# Patient Record
Sex: FEMALE | Race: WHITE | NOT HISPANIC OR LATINO | Employment: FULL TIME | URBAN - METROPOLITAN AREA
[De-identification: names, ages, dates, MRNs, and addresses within clinical notes are randomized per-mention and may not be internally consistent; named-entity substitution may affect disease eponyms.]

---

## 2018-04-26 ENCOUNTER — OFFICE VISIT (OUTPATIENT)
Dept: GASTROENTEROLOGY | Facility: CLINIC | Age: 43
End: 2018-04-26

## 2018-04-26 VITALS
BODY MASS INDEX: 48.32 KG/M2 | WEIGHT: 283 LBS | HEIGHT: 64 IN | SYSTOLIC BLOOD PRESSURE: 136 MMHG | TEMPERATURE: 98.2 F | DIASTOLIC BLOOD PRESSURE: 96 MMHG

## 2018-04-26 DIAGNOSIS — K21.00 GASTROESOPHAGEAL REFLUX DISEASE WITH ESOPHAGITIS: Primary | ICD-10-CM

## 2018-04-26 PROCEDURE — 99213 OFFICE O/P EST LOW 20 MIN: CPT | Performed by: INTERNAL MEDICINE

## 2018-04-26 RX ORDER — LEVOTHYROXINE SODIUM 0.15 MG/1
125 TABLET ORAL DAILY
COMMUNITY

## 2018-04-26 RX ORDER — PANTOPRAZOLE SODIUM 40 MG/1
40 TABLET, DELAYED RELEASE ORAL DAILY
Qty: 30 TABLET | Refills: 11 | Status: SHIPPED | OUTPATIENT
Start: 2018-04-26 | End: 2019-05-28 | Stop reason: SDUPTHER

## 2018-04-26 RX ORDER — CALCITRIOL 0.5 UG/1
0.25 CAPSULE, LIQUID FILLED ORAL DAILY
COMMUNITY

## 2018-04-26 RX ORDER — DICYCLOMINE HYDROCHLORIDE 10 MG/1
10 CAPSULE ORAL
COMMUNITY
End: 2018-04-26

## 2018-04-26 NOTE — PROGRESS NOTES
Chief Complaint   Patient presents with   • Abdominal Pain     Dyspepsia       Lucila Amaya is a  43 y.o. female here for a follow up visit for GERD.    HPI    Patient 43-year-old female with history of GERD and esophageal stricture in the remote past presenting with recurrent symptoms.  Patient had been doing very well on pantoprazole but according to patient was stopped because of insurance coverage.  Insurance reported to the patient that they would cover ranitidine and changed her prescription to that.  Unfortunately patient had rather rapid return of her symptoms and now having episodes of dysphagia related to poor food passage.  Patient daily feels like the food is moving down to her stomach been moving back into her esophagus.  Patient reports no fever or chills but positive for weight loss.  Patient now for further recommendations.    Past Medical History:   Diagnosis Date   • Diverticulitis    • GERD (gastroesophageal reflux disease)    • Peptic ulcer disease    • Thyroid cancer    • Thyroid disorder    • TIA (transient ischemic attack)          Current Outpatient Prescriptions:   •  calcitriol (ROCALTROL) 0.5 MCG capsule, Take 2 mcg by mouth Daily., Disp: , Rfl:   •  levothyroxine (SYNTHROID) 150 MCG tablet, Take 150 mcg by mouth Daily., Disp: , Rfl:   •  Triamterene-HCTZ (MAXZIDE PO), Take  by mouth., Disp: , Rfl:   •  pantoprazole (PROTONIX) 40 MG EC tablet, Take 1 tablet by mouth Daily., Disp: 30 tablet, Rfl: 11    Allergies   Allergen Reactions   • Penicillins Anaphylaxis     Hives       Social History     Social History   • Marital status:      Spouse name: N/A   • Number of children: N/A   • Years of education: N/A     Occupational History   • Not on file.     Social History Main Topics   • Smoking status: Never Smoker   • Smokeless tobacco: Not on file   • Alcohol use Not on file   • Drug use: Unknown   • Sexual activity: Not on file     Other Topics Concern   • Not on file     Social  History Narrative   • No narrative on file       History reviewed. No pertinent family history.    Review of Systems   Constitutional: Negative.    HENT: Positive for trouble swallowing. Negative for sore throat, tinnitus and voice change.    Respiratory: Negative.    Cardiovascular: Negative.    Gastrointestinal: Negative.    Skin: Negative.    Hematological: Negative.        Vitals:    04/26/18 1434   BP: 136/96   Temp: 98.2 °F (36.8 °C)       Physical Exam   Constitutional: She is oriented to person, place, and time. She appears well-developed and well-nourished.   HENT:   Head: Normocephalic and atraumatic.   Eyes: Pupils are equal, round, and reactive to light. No scleral icterus.   Cardiovascular: Normal rate and regular rhythm.    Pulmonary/Chest: Effort normal and breath sounds normal.   Abdominal: Soft. Bowel sounds are normal. She exhibits no distension and no mass. There is no tenderness. No hernia.   Neurological: She is alert and oriented to person, place, and time.   Skin: Skin is warm and dry. No rash noted.   Psychiatric: She has a normal mood and affect. Her behavior is normal.   Vitals reviewed.      No visits with results within 2 Month(s) from this visit.   Latest known visit with results is:   Hospital Outpatient Visit on 01/20/2016   Component Date Value Ref Range Status   • CONVERTED (HISTORICAL) CASE TYPE 01/21/2016 Surgical Pathology   Final   • CONVERTED (HISTORICAL) ACCESSION N* 01/21/2016    Final   • CONVERTED (HISTORICAL) RESULT STAT* 01/21/2016 FINAL   Final   • CONVERTED (HISTORICAL) SPECIMEN DE* 01/21/2016 GE JUNCTION BX ah   Final   • HCG Urine, QL 01/21/2016 Negative   Final       Lucila was seen today for abdominal pain.    Diagnoses and all orders for this visit:    Gastroesophageal reflux disease with esophagitis    Other orders  -     pantoprazole (PROTONIX) 40 MG EC tablet; Take 1 tablet by mouth Daily.      Patient 43-year-old female with history of GERD and remote  history of esophageal stricture requiring dilation complaining of recurrent dysphagia and reflux since being DC'd from her pantoprazole because of insurance.  Patient notes that her reflux is severely exacerbated since coming off the medication and insurance offering her ranitidine as a substitute.  Patient reports no vomiting no melena or bright red blood per rectum but she has lost weight.  At this point will reinstitute pantoprazole, patient looking to use good Rx, and if symptoms improve no further intervention needed.  If symptoms of dysphagia persist would recommend patient repeat EGD with possible dilation.

## 2019-05-29 RX ORDER — PANTOPRAZOLE SODIUM 40 MG/1
40 TABLET, DELAYED RELEASE ORAL DAILY
Qty: 30 TABLET | Refills: 0 | Status: SHIPPED | OUTPATIENT
Start: 2019-05-29 | End: 2019-10-23 | Stop reason: SDUPTHER

## 2019-07-10 ENCOUNTER — PRIOR AUTHORIZATION (OUTPATIENT)
Dept: GASTROENTEROLOGY | Facility: CLINIC | Age: 44
End: 2019-07-10

## 2019-08-22 RX ORDER — PANTOPRAZOLE SODIUM 40 MG/1
TABLET, DELAYED RELEASE ORAL
Qty: 30 TABLET | Refills: 0 | OUTPATIENT
Start: 2019-08-22

## 2019-10-23 ENCOUNTER — OFFICE VISIT (OUTPATIENT)
Dept: GASTROENTEROLOGY | Facility: CLINIC | Age: 44
End: 2019-10-23

## 2019-10-23 VITALS
HEIGHT: 64 IN | WEIGHT: 293 LBS | SYSTOLIC BLOOD PRESSURE: 128 MMHG | DIASTOLIC BLOOD PRESSURE: 84 MMHG | TEMPERATURE: 97.9 F | BODY MASS INDEX: 50.02 KG/M2

## 2019-10-23 DIAGNOSIS — K21.00 GASTROESOPHAGEAL REFLUX DISEASE WITH ESOPHAGITIS: ICD-10-CM

## 2019-10-23 DIAGNOSIS — R13.19 OTHER DYSPHAGIA: Primary | ICD-10-CM

## 2019-10-23 DIAGNOSIS — Z12.11 SCREENING FOR COLON CANCER: ICD-10-CM

## 2019-10-23 PROCEDURE — 99214 OFFICE O/P EST MOD 30 MIN: CPT | Performed by: NURSE PRACTITIONER

## 2019-10-23 RX ORDER — GABAPENTIN 300 MG/1
CAPSULE ORAL
Refills: 1 | COMMUNITY
Start: 2019-10-14 | End: 2021-11-11

## 2019-10-23 RX ORDER — TRAMADOL HYDROCHLORIDE 50 MG/1
TABLET ORAL
Refills: 1 | COMMUNITY
Start: 2019-10-14

## 2019-10-23 RX ORDER — PANTOPRAZOLE SODIUM 40 MG/1
40 TABLET, DELAYED RELEASE ORAL DAILY
Qty: 90 TABLET | Refills: 3 | Status: SHIPPED | OUTPATIENT
Start: 2019-10-23 | End: 2020-01-02 | Stop reason: SDUPTHER

## 2019-10-23 NOTE — PROGRESS NOTES
Chief Complaint   Patient presents with   • Difficulty Swallowing     HPI    Lucila Amaya is a  44 y.o. female here for a follow up visit for dysphagia.   This is an established patient Dr. Barnes's, new to me.  She has a history of GERD and esophageal stricture in the remote past presenting today with complaints of dyspepsia and dysphagia.  Onset of symptoms over the past 6 months.  Dysphagia is now occurring with liquids and solids.  She will regurgitate for relief.  Worsening of reflux mainly in the evening.  She does try to avoid known dietary triggers them allow 2 to 3 hours between dinner and laying down.  No nausea or vomiting.  Her appetite is good.  Her weight is stable.  BMI 50.8.    BM daily.  No diarrhea, constipation, or rectal bleeding.  She had a colonoscopy for rectal bleeding greater than 10 years ago.  Based on her age she is due for screening colonoscopy.    Past Medical History:   Diagnosis Date   • Degenerative disorder of bone    • Diverticulitis    • GERD (gastroesophageal reflux disease)    • Peptic ulcer disease    • Thyroid cancer (CMS/HCC)    • Thyroid disorder    • TIA (transient ischemic attack)        Past Surgical History:   Procedure Laterality Date   • CYST REMOVAL      bronchial   • THYROID BIOPSY     • UPPER GASTROINTESTINAL ENDOSCOPY  01/2016    z-line irreg, [place] Grade A reflux, bx; HH, esophageal motility disorder       Scheduled Meds:  Outpatient Encounter Medications as of 10/23/2019   Medication Sig Dispense Refill   • calcitriol (ROCALTROL) 0.5 MCG capsule Take 0.25 mcg by mouth Daily.     • gabapentin (NEURONTIN) 300 MG capsule TK 1 C PO QHS  1   • levothyroxine (SYNTHROID) 150 MCG tablet Take 150 mcg by mouth Daily.     • pantoprazole (PROTONIX) 40 MG EC tablet Take 1 tablet by mouth Daily. ** Please make an appointment for further refills. Thank you. 90 tablet 3   • traMADol (ULTRAM) 50 MG tablet TK 1 T PO BID FOR 30 DAYS  1   • Triamterene-HCTZ (MAXZIDE PO) Take  by  mouth.     • [DISCONTINUED] pantoprazole (PROTONIX) 40 MG EC tablet Take 1 tablet by mouth Daily. ** Please make an appointment for further refills. Thank you. 30 tablet 0     No facility-administered encounter medications on file as of 10/23/2019.        Continuous Infusions:  No current facility-administered medications for this visit.     PRN Meds:.    Allergies   Allergen Reactions   • Penicillins Anaphylaxis     Hives       Social History     Socioeconomic History   • Marital status:      Spouse name: Not on file   • Number of children: Not on file   • Years of education: Not on file   • Highest education level: Not on file   Tobacco Use   • Smoking status: Never Smoker       History reviewed. No pertinent family history.    Review of Systems   Constitutional: Negative for activity change, appetite change, fatigue, fever and unexpected weight change.   HENT: Positive for trouble swallowing.    Respiratory: Negative for apnea, cough, choking, chest tightness, shortness of breath and wheezing.    Cardiovascular: Negative for chest pain, palpitations and leg swelling.   Gastrointestinal: Negative for abdominal distention, abdominal pain, anal bleeding, blood in stool, constipation, diarrhea, nausea, rectal pain and vomiting.        + dyspepsia        Vitals:    10/23/19 0833   BP: 128/84   Temp: 97.9 °F (36.6 °C)       Physical Exam   Constitutional: She is oriented to person, place, and time. She appears well-developed and well-nourished.   Eyes: Pupils are equal, round, and reactive to light.   Cardiovascular: Normal rate, regular rhythm and normal heart sounds.   Pulmonary/Chest: Effort normal and breath sounds normal. No respiratory distress. She has no wheezes.   Abdominal: Soft. Bowel sounds are normal. She exhibits no distension and no mass. There is no tenderness. There is no guarding. No hernia.   Musculoskeletal: Normal range of motion.   Neurological: She is alert and oriented to person, place,  and time.   Skin: Skin is warm and dry. Capillary refill takes less than 2 seconds.   Psychiatric: She has a normal mood and affect. Her behavior is normal.       No images are attached to the encounter.    Lucila was seen today for difficulty swallowing.    Diagnoses and all orders for this visit:    Other dysphagia  -     Case Request; Standing  -     Obtain Informed Consent; Standing  -     Verify Bowel Prep Was Successful; Standing  -     Give Tap Water Enema If Bowel Prep Insufficient; Standing  -     Case Request    Gastroesophageal reflux disease with esophagitis  -     Case Request; Standing  -     Obtain Informed Consent; Standing  -     Verify Bowel Prep Was Successful; Standing  -     Give Tap Water Enema If Bowel Prep Insufficient; Standing  -     Case Request    Screening for colon cancer  -     Case Request; Standing  -     Obtain Informed Consent; Standing  -     Verify Bowel Prep Was Successful; Standing  -     Give Tap Water Enema If Bowel Prep Insufficient; Standing  -     Case Request    Other orders  -     pantoprazole (PROTONIX) 40 MG EC tablet; Take 1 tablet by mouth Daily. ** Please make an appointment for further refills. Thank you.    Assessment/plan    Pleasant 44-year-old female seen today for complaints of dyspepsia and dysphagia.  She has required dilation in the past around 2016.  At this point recommend she increase Protonix to twice daily dosing and we will arrange for EGD with Dr. Barnes.  She will likely need a dilation.  Continue with GERD diet/lifestyle modifications which I did reinforce today.  Avoid NSAIDs.    Based on her age she is due for screening colonoscopy as such we will arrange this with Dr. Barnes.    Follow-up and further recommendations pending endoscopic evaluation.

## 2019-11-22 ENCOUNTER — ANESTHESIA EVENT (OUTPATIENT)
Dept: GASTROENTEROLOGY | Facility: HOSPITAL | Age: 44
End: 2019-11-22

## 2019-11-22 ENCOUNTER — ANESTHESIA (OUTPATIENT)
Dept: GASTROENTEROLOGY | Facility: HOSPITAL | Age: 44
End: 2019-11-22

## 2019-11-22 ENCOUNTER — HOSPITAL ENCOUNTER (OUTPATIENT)
Facility: HOSPITAL | Age: 44
Setting detail: HOSPITAL OUTPATIENT SURGERY
Discharge: HOME | End: 2019-11-22
Attending: INTERNAL MEDICINE | Admitting: INTERNAL MEDICINE

## 2019-11-22 VITALS
OXYGEN SATURATION: 99 % | HEART RATE: 68 BPM | BODY MASS INDEX: 50.02 KG/M2 | SYSTOLIC BLOOD PRESSURE: 124 MMHG | WEIGHT: 293 LBS | DIASTOLIC BLOOD PRESSURE: 93 MMHG | TEMPERATURE: 98.3 F | HEIGHT: 64 IN | RESPIRATION RATE: 14 BRPM

## 2019-11-22 DIAGNOSIS — K21.00 GASTROESOPHAGEAL REFLUX DISEASE WITH ESOPHAGITIS: ICD-10-CM

## 2019-11-22 DIAGNOSIS — R13.19 OTHER DYSPHAGIA: ICD-10-CM

## 2019-11-22 DIAGNOSIS — Z12.11 SCREENING FOR COLON CANCER: ICD-10-CM

## 2019-11-22 LAB
B-HCG UR QL: NEGATIVE
INTERNAL NEGATIVE CONTROL: NEGATIVE
INTERNAL POSITIVE CONTROL: POSITIVE
Lab: NORMAL

## 2019-11-22 PROCEDURE — 81025 URINE PREGNANCY TEST: CPT | Performed by: INTERNAL MEDICINE

## 2019-11-22 PROCEDURE — 45385 COLONOSCOPY W/LESION REMOVAL: CPT | Performed by: INTERNAL MEDICINE

## 2019-11-22 PROCEDURE — 43239 EGD BIOPSY SINGLE/MULTIPLE: CPT | Performed by: INTERNAL MEDICINE

## 2019-11-22 PROCEDURE — 25010000002 PROPOFOL 10 MG/ML EMULSION: Performed by: NURSE ANESTHETIST, CERTIFIED REGISTERED

## 2019-11-22 PROCEDURE — 88305 TISSUE EXAM BY PATHOLOGIST: CPT | Performed by: INTERNAL MEDICINE

## 2019-11-22 RX ORDER — GLYCOPYRROLATE 0.2 MG/ML
INJECTION INTRAMUSCULAR; INTRAVENOUS AS NEEDED
Status: DISCONTINUED | OUTPATIENT
Start: 2019-11-22 | End: 2019-11-22 | Stop reason: SURG

## 2019-11-22 RX ORDER — SODIUM CHLORIDE, SODIUM LACTATE, POTASSIUM CHLORIDE, CALCIUM CHLORIDE 600; 310; 30; 20 MG/100ML; MG/100ML; MG/100ML; MG/100ML
30 INJECTION, SOLUTION INTRAVENOUS CONTINUOUS PRN
Status: DISCONTINUED | OUTPATIENT
Start: 2019-11-22 | End: 2019-11-22 | Stop reason: HOSPADM

## 2019-11-22 RX ORDER — SODIUM CHLORIDE 0.9 % (FLUSH) 0.9 %
10 SYRINGE (ML) INJECTION AS NEEDED
Status: DISCONTINUED | OUTPATIENT
Start: 2019-11-22 | End: 2019-11-22 | Stop reason: HOSPADM

## 2019-11-22 RX ORDER — PROPOFOL 10 MG/ML
VIAL (ML) INTRAVENOUS CONTINUOUS PRN
Status: DISCONTINUED | OUTPATIENT
Start: 2019-11-22 | End: 2019-11-22 | Stop reason: SURG

## 2019-11-22 RX ORDER — LIDOCAINE HYDROCHLORIDE 20 MG/ML
INJECTION, SOLUTION INFILTRATION; PERINEURAL AS NEEDED
Status: DISCONTINUED | OUTPATIENT
Start: 2019-11-22 | End: 2019-11-22 | Stop reason: SURG

## 2019-11-22 RX ADMIN — GLYCOPYRROLATE 0.2 MCG: 0.2 INJECTION INTRAMUSCULAR; INTRAVENOUS at 10:35

## 2019-11-22 RX ADMIN — PROPOFOL 180 MCG/KG/MIN: 10 INJECTION, EMULSION INTRAVENOUS at 10:34

## 2019-11-22 RX ADMIN — LIDOCAINE HYDROCHLORIDE 60 MG: 20 INJECTION, SOLUTION INFILTRATION; PERINEURAL at 10:34

## 2019-11-22 RX ADMIN — SODIUM CHLORIDE, POTASSIUM CHLORIDE, SODIUM LACTATE AND CALCIUM CHLORIDE 30 ML/HR: 600; 310; 30; 20 INJECTION, SOLUTION INTRAVENOUS at 10:28

## 2019-11-22 NOTE — ANESTHESIA POSTPROCEDURE EVALUATION
"Patient: Lucila Amaya    Procedure Summary     Date:  11/22/19 Room / Location:   [facility]    Anesthesia Start:  1030 Anesthesia Stop:  1107    Procedures:       ESOPHAGOGASTRODUODENOSCOPY WITH COLD BIOPSIES (N/A Esophagus)      COLONOSCOPY INTO CECUM & TERMINAL ILEUM WITH HOT SNARE POLYPECTOMIES (N/A ) Diagnosis:       Other dysphagia      Gastroesophageal reflux disease with esophagitis      Screening for colon cancer      Colon polyps      Diverticulosis      Internal hemorrhoids      Gastritis      Hiatal hernia      (Other dysphagia [R13.19])      (Gastroesophageal reflux disease with esophagitis [K21.0])      (Screening for colon cancer [Z12.11])    Surgeon:  Damon Barnes MD Provider:  Sharda Trevino MD    Anesthesia Type:  MAC ASA Status:  3          Anesthesia Type: MAC  Last vitals  BP   120/62 (11/22/19 1110)   Temp   36.8 °C (98.3 °F) (11/22/19 1015)   Pulse   82 (11/22/19 1110)   Resp   14 (11/22/19 1110)     SpO2   96 % (11/22/19 1110)     Post Anesthesia Care and Evaluation    Patient location during evaluation: PHASE II  Patient participation: complete - patient participated  Level of consciousness: awake  Pain management: adequate  Airway patency: patent  Anesthetic complications: No anesthetic complications    Cardiovascular status: acceptable  Respiratory status: acceptable  Hydration status: acceptable    Comments: /62 (BP Location: Left arm, Patient Position: Lying)   Pulse 82   Temp 36.8 °C (98.3 °F) (Oral)   Resp 14   Ht 162.6 cm (64\")   Wt 135 kg (297 lb 8 oz)   SpO2 96%   BMI 51.07 kg/m²       "

## 2019-11-22 NOTE — ANESTHESIA PREPROCEDURE EVALUATION
Anesthesia Evaluation     Patient summary reviewed and Nursing notes reviewed   history of anesthetic complications: PONV    NPO Liquid Status: > 2 hours           Airway   Mallampati: II  Dental - normal exam     Pulmonary - normal exam   Cardiovascular - normal exam        Neuro/Psych  (+) TIA,     GI/Hepatic/Renal/Endo    (+) morbid obesity, GERD, PUD,      Musculoskeletal     Abdominal   (+) obese,    Substance History      OB/GYN          Other      history of cancer                    Anesthesia Plan    ASA 3     MAC

## 2019-11-26 LAB
CYTO UR: NORMAL
LAB AP CASE REPORT: NORMAL
LAB AP DIAGNOSIS COMMENT: NORMAL
PATH REPORT.FINAL DX SPEC: NORMAL
PATH REPORT.GROSS SPEC: NORMAL

## 2019-12-09 ENCOUNTER — HOSPITAL ENCOUNTER (OUTPATIENT)
Dept: GENERAL RADIOLOGY | Facility: HOSPITAL | Age: 44
Discharge: HOME | End: 2019-12-09
Admitting: INTERNAL MEDICINE

## 2019-12-09 DIAGNOSIS — R13.19 OTHER DYSPHAGIA: ICD-10-CM

## 2019-12-09 DIAGNOSIS — K21.00 GASTROESOPHAGEAL REFLUX DISEASE WITH ESOPHAGITIS: ICD-10-CM

## 2019-12-09 PROCEDURE — 74220 X-RAY XM ESOPHAGUS 1CNTRST: CPT

## 2019-12-09 RX ADMIN — BARIUM SULFATE 700 MG: 700 TABLET ORAL at 11:00

## 2019-12-09 RX ADMIN — BARIUM SULFATE 135 ML: 980 POWDER, FOR SUSPENSION ORAL at 11:07

## 2019-12-09 RX ADMIN — ANTACID/ANTIFLATULENT 1 TABLET: 380; 550; 10; 10 GRANULE, EFFERVESCENT ORAL at 11:10

## 2019-12-09 RX ADMIN — BARIUM SULFATE 183 ML: 960 POWDER, FOR SUSPENSION ORAL at 11:05

## 2020-01-02 ENCOUNTER — TELEPHONE (OUTPATIENT)
Dept: GASTROENTEROLOGY | Facility: CLINIC | Age: 45
End: 2020-01-02

## 2020-01-02 RX ORDER — PANTOPRAZOLE SODIUM 40 MG/1
40 TABLET, DELAYED RELEASE ORAL 2 TIMES DAILY
Qty: 180 TABLET | Refills: 3 | Status: SHIPPED | OUTPATIENT
Start: 2020-01-02 | End: 2021-01-22

## 2020-01-02 NOTE — TELEPHONE ENCOUNTER
Repeat c/s in 5 yrs added to  11/22/24.   Patient called, advised as per Dr. Barnes's note. She states RABIA White has increased her Pantoprazole to BID and it is working well. She states she needs a new prescription for the twice a day dosing. Pantoprazole 40 mg one tablet twice a day #180 refill 3, e-scribed to [facility]. She verb udnerstanding.

## 2020-01-02 NOTE — TELEPHONE ENCOUNTER
----- Message from Damon Barnes MD sent at 12/20/2019  4:17 PM EST -----  Polyp benign, repeat colonoscopy 5 years as discussed.  Gastric mucosa with chronic gastritis and esophagram was normal with a small hiatal hernia noted.  Treat for reflux and monitor clinical response.

## 2020-01-03 ENCOUNTER — PRIOR AUTHORIZATION (OUTPATIENT)
Dept: GASTROENTEROLOGY | Facility: CLINIC | Age: 45
End: 2020-01-03

## 2020-03-17 ENCOUNTER — APPOINTMENT (OUTPATIENT)
Dept: WOMENS IMAGING | Facility: HOSPITAL | Age: 45
End: 2020-03-17

## 2020-03-17 PROCEDURE — 77067 SCR MAMMO BI INCL CAD: CPT | Performed by: RADIOLOGY

## 2020-09-16 ENCOUNTER — OFFICE VISIT (OUTPATIENT)
Dept: ORTHOPEDIC SURGERY | Facility: CLINIC | Age: 45
End: 2020-09-16

## 2020-09-16 ENCOUNTER — HOSPITAL ENCOUNTER (OUTPATIENT)
Dept: OTHER | Facility: HOSPITAL | Age: 45
Discharge: HOME | End: 2020-09-16
Attending: PHYSICIAN ASSISTANT

## 2020-09-16 VITALS — TEMPERATURE: 97.6 F | BODY MASS INDEX: 48.32 KG/M2 | WEIGHT: 283 LBS | HEIGHT: 64 IN

## 2020-09-16 DIAGNOSIS — M25.562 LEFT KNEE PAIN, UNSPECIFIED CHRONICITY: Primary | ICD-10-CM

## 2020-09-16 PROCEDURE — 99204 OFFICE O/P NEW MOD 45 MIN: CPT | Performed by: PHYSICIAN ASSISTANT

## 2020-09-16 RX ORDER — LIOTHYRONINE SODIUM 5 UG/1
5 TABLET ORAL DAILY
COMMUNITY

## 2020-09-16 NOTE — PROGRESS NOTES
NEW VISIT    Patient: Lucila Amaya  ?  YOB: 1975    MRN: [id number]  ?  Chief Complaint   Patient presents with   • Left Knee - Pain, Establish Care      ?  HPI:   Lucila Amaya is a 45 y.o. female who presents with complaint of left knee pain and swelling.  Her pain started suddenly approximately 4 days ago but significantly worsened over the last 1 to 2 days.  She states she is unable to bear weight onto the knee without feeling as if it is going to give out from underneath of her.  She does not recall a specific injury but states she often gets hit by her large dogs which causes her to be knocked off balance.    Pain Location:  LEFT knee  Radiation: Into the lateral and posterior aspects  Quality: aching  Intensity/Severity: severe  Duration: 4 days  Progression of symptoms: yes, progressive worsening  Onset quality: sudden  Timing: constant  Aggravating Factors: any weight bearing, going up and down stairs, walking, standing  Alleviating Factors: Tylenol, NSAIDs, rest  Previous Episodes: none mentioned  Associated Symptoms: pain, swelling, decreased ROM  ADLs Affected: grooming/hygiene/toileting/personal care, dressing, ambulating, work related activities, recreational activities/sports    This patient is a new patient.  This problem is new to this examiner.      Allergies:   Allergies   Allergen Reactions   • Penicillins Anaphylaxis     Hives  CONVULSIONS       Medications:   Home Medications:  Current Outpatient Medications on File Prior to Visit   Medication Sig   • gabapentin (NEURONTIN) 300 MG capsule TK 1 C PO QHS   • levothyroxine (SYNTHROID) 150 MCG tablet Take 150 mcg by mouth Daily.   • liothyronine (CYTOMEL) 5 MCG tablet Take 5 mcg by mouth Daily.   • pantoprazole (PROTONIX) 40 MG EC tablet Take 1 tablet by mouth 2 (Two) Times a Day. ** Please make an appointment for further refills. Thank you.   • progesterone (PROMETRIUM) 200 MG capsule Take 200 mg by mouth Daily.   •  Triamterene-HCTZ (MAXZIDE PO) Take  by mouth.   • calcitriol (ROCALTROL) 0.5 MCG capsule Take 0.25 mcg by mouth Daily.   • traMADol (ULTRAM) 50 MG tablet TK 1 T PO BID FOR 30 DAYS     No current facility-administered medications on file prior to visit.      Current Medications:  Scheduled Meds:  PRN Meds:.    I have reviewed the patient's medical history in detail and updated the computerized patient record.  Review and summarization of old records include:    Past Medical History:   Diagnosis Date   • Degenerative disorder of bone    • Diverticulitis    • GERD (gastroesophageal reflux disease)    • Peptic ulcer disease    • Thyroid cancer (CMS/HCC)    • Thyroid disorder    • TIA (transient ischemic attack)      Past Surgical History:   Procedure Laterality Date   • COLONOSCOPY     • COLONOSCOPY N/A 11/22/2019    Procedure: COLONOSCOPY INTO CECUM & TERMINAL ILEUM WITH HOT SNARE POLYPECTOMIES;  Surgeon: Damon Barnes MD;  Location: [facility] ENDOSCOPY;  Service: Gastroenterology   • CYST REMOVAL      bronchial   • ENDOSCOPY N/A 11/22/2019    Procedure: ESOPHAGOGASTRODUODENOSCOPY WITH COLD BIOPSIES;  Surgeon: Damon Barnes MD;  Location: [facility] ENDOSCOPY;  Service: Gastroenterology   • THYROID BIOPSY     • UPPER GASTROINTESTINAL ENDOSCOPY  01/2016    z-line irreg, [place] Grade A reflux, bx; HH, esophageal motility disorder     Social History     Occupational History   • Not on file   Tobacco Use   • Smoking status: Never Smoker   • Smokeless tobacco: Never Used   Substance and Sexual Activity   • Alcohol use: No     Frequency: Never   • Drug use: Defer   • Sexual activity: Defer     Family History   Problem Relation Age of Onset   • Cancer Other    • Diabetes Other    • Hypertension Other    • Heart disease Other          Review of Systems  Constitutional: Negative.  Negative for fever.   Eyes: Negative.    Respiratory: Negative.    Cardiovascular: Negative.    Endocrine: Negative.    Musculoskeletal: Positive  "for arthralgias, gait problem and joint swelling.   Skin: Negative.  Negative for rash and wound.   Allergic/Immunologic: Negative.    Neurological: Negative for numbness.   Hematological: Negative.    Psychiatric/Behavioral: Negative.         Wt Readings from Last 3 Encounters:   09/16/20 128 kg (283 lb)   11/22/19 135 kg (297 lb 8 oz)   10/23/19 134 kg (296 lb)     Ht Readings from Last 3 Encounters:   09/16/20 162.6 cm (64\")   11/22/19 162.6 cm (64\")   10/23/19 162.6 cm (64\")     Body mass index is 48.58 kg/m².  Facility age limit for growth percentiles is 20 years.  Vitals:    09/16/20 1408   Temp: 97.6 °F (36.4 °C)         Physical Exam  Constitutional: Patient is oriented to person, place, and time. Appears well-developed and well-nourished.   HENT:   Head: Normocephalic and atraumatic.   Eyes: Conjunctivae and EOM are normal. Pupils are equal, round, and reactive to light.   Cardiovascular: Normal rate and intact distal pulses.   Pulmonary/Chest: Effort normal.   Musculoskeletal:   See detailed exam below   Neurological: Alert and oriented to person, place, and time. No sensory deficit. Coordination normal.   Skin: Skin is warm and dry. Capillary refill takes less than 2 seconds. No rash noted. No erythema.   Psychiatric: Patient has a normal mood and affect. Her behavior is normal. Judgment and thought content normal.   Nursing note and vitals reviewed.      Ortho Exam:   LEFT knee:  Positive for effusion of the knee joint and significant tenderness with palpation of the lateral meniscus.  Positive for significant tenderness with palpation of the posterior knee.        Positive for tenderness over the medial face of the tibia just distal to the joint line.  Range of motion-extension to flexion: 0-75 degrees.  Full extension is associated with pain.  Positive Apley's grinding test over the joint line.   Positive Jenise's.   Negative for instability on medial or lateral testing.   Anterior and posterior " drawer testing is negative.   Lachman test is negative.  The dorsalis pedis and posterior tibial artery pulses are palpable. Common peroneal nerve function is well preserved.  Gait is cautious and somewhat antalgic.         Diagnostics:  Left knee xrays 3 views were ordered by Inderjit Brown PA-C and performed at [facility] 9/16/2020. These images were independently viewed and interpreted by myself, my impression as follows:       Findings: Mild to moderate osteoarthritis with mild joint space narrowing of the medial joint space, there is osteophyte lipping throughout, there is subchondral sclerosis of the medial tibial plateau, mild to moderate narrowing of the patellofemoral compartment, small joint effusion  Bony lesion: no  Soft tissues: increased  Joint spaces: decreased  Hardware appropriately positioned: not applicable  Prior studies available for comparison: no       Assessment:  Lucila was seen today for pain and establish care.    Diagnoses and all orders for this visit:    Left knee pain, unspecified chronicity  -     XR Knee 1 or 2 View Left; Future  -     XR Knee 1 or 2 View Left  -     MRI Knee Left Without Contrast; Future      MDM  Number of Diagnoses or Management Options  Left knee pain, unspecified chronicity: new, needed workup     Amount and/or Complexity of Data Reviewed  Tests in the radiology section of CPT®: ordered  Independent visualization of images, tracings, or specimens: yes    Risk of Complications, Morbidity, and/or Mortality  Presenting problems: moderate           Plan    Orders Placed This Encounter   Procedures   • XR Knee 1 or 2 View Left   • MRI Knee Left Without Contrast      · Management of an acute complicated injury /Acute condition with unknown prognosis requiring further work-up  · Discussion of orthopaedic goals and activities.  · Risk, benefits, and merits of treatment alternatives reviewed with the patient.  · Ice, heat, and/or  modalities as beneficial  · To schedule MRI of Left knee without contrast  · Patient is encouraged to call or return for any issues or concerns.  · Hinged brace provided at today's visit  · Follow up will be based on when MRI has been performed      Inderjit Brown PA-C  Date of encounter: 09/16/2020     Electronically signed by Inderjit Brown PA-C, 09/16/20, 2:49 PM EDT.    EMR Dragon/Transcription disclaimer:  Much of this encounter note is an electronic transcription/translation of spoken language to printed text. The electronic translation of spoken language may permit erroneous, or at times, nonsensical words or phrases to be inadvertently transcribed; Although I have reviewed the note for such errors, some may still exist.

## 2020-09-26 PROBLEM — M25.562 LEFT KNEE PAIN: Status: ACTIVE | Noted: 2020-09-26

## 2020-09-28 ENCOUNTER — TELEPHONE (OUTPATIENT)
Dept: ORTHOPEDIC SURGERY | Facility: CLINIC | Age: 45
End: 2020-09-28

## 2020-10-02 ENCOUNTER — TELEPHONE (OUTPATIENT)
Dept: ORTHOPEDIC SURGERY | Facility: CLINIC | Age: 45
End: 2020-10-02

## 2020-10-02 NOTE — TELEPHONE ENCOUNTER
PATIENT WANTED YOU TO KNOW SHE COULDN'T AFFORD TO HAVE THE MRI AT THIS TIME.    SHE STATES SHE IS SOME BETTER AND WILL CONTINUE WITH THE BRACE FOR A COUPLE MORE WEEKS. IF NOT CONTINUING TO IMPROVE SHE WILL CALL BACK AND RESCHEDULE MRI

## 2021-01-22 RX ORDER — PANTOPRAZOLE SODIUM 40 MG/1
TABLET, DELAYED RELEASE ORAL
Qty: 180 TABLET | Refills: 0 | Status: SHIPPED | OUTPATIENT
Start: 2021-01-22 | End: 2021-09-20 | Stop reason: SDUPTHER

## 2021-07-26 ENCOUNTER — APPOINTMENT (OUTPATIENT)
Dept: WOMENS IMAGING | Facility: HOSPITAL | Age: 46
End: 2021-07-26

## 2021-07-26 PROCEDURE — 77063 BREAST TOMOSYNTHESIS BI: CPT | Performed by: RADIOLOGY

## 2021-07-26 PROCEDURE — 77067 SCR MAMMO BI INCL CAD: CPT | Performed by: RADIOLOGY

## 2021-09-20 ENCOUNTER — OFFICE VISIT (OUTPATIENT)
Dept: GASTROENTEROLOGY | Facility: CLINIC | Age: 46
End: 2021-09-20

## 2021-09-20 VITALS — HEIGHT: 64 IN | WEIGHT: 293 LBS | TEMPERATURE: 95.7 F | BODY MASS INDEX: 50.02 KG/M2

## 2021-09-20 DIAGNOSIS — K44.9 HIATAL HERNIA: ICD-10-CM

## 2021-09-20 DIAGNOSIS — K21.9 GASTROESOPHAGEAL REFLUX DISEASE, UNSPECIFIED WHETHER ESOPHAGITIS PRESENT: Primary | ICD-10-CM

## 2021-09-20 DIAGNOSIS — Z86.010 PERSONAL HISTORY OF COLONIC POLYPS: ICD-10-CM

## 2021-09-20 PROCEDURE — 99213 OFFICE O/P EST LOW 20 MIN: CPT | Performed by: NURSE PRACTITIONER

## 2021-09-20 RX ORDER — PANTOPRAZOLE SODIUM 40 MG/1
40 TABLET, DELAYED RELEASE ORAL 2 TIMES DAILY
Qty: 180 TABLET | Refills: 3 | Status: SHIPPED | OUTPATIENT
Start: 2021-09-20 | End: 2022-05-09 | Stop reason: SDUPTHER

## 2021-09-20 NOTE — PROGRESS NOTES
Chief Complaint   Patient presents with   • Heartburn     med refill       HPI    Lucila Amaya is a  46 y.o. female here for a follow up visit for GERD.    This patient follows with Dr. Barnes and myself.    Visit today she is having breakthrough dyspeptic symptoms after she ran out of PPI therapy.  Historically has required twice daily dosing to manage symptoms.  Denies nausea, vomiting, dysphagia, odynophagia.  Her appetite is good.  Her weight is stable.  She follows a antireflux diet for the most part.  Current BMI greater than 50.    No diarrhea, constipation, rectal bleeding reported.    Additional data reviewed:    EGD 2019 with a medium size hiatal hernia and erythematous mucosa in the antrum otherwise normal.  Colonoscopy 2019 with normal ileum, polyps, diverticulosis, nonbleeding internal hemorrhoids.  Recall 5 years.    Past Medical History:   Diagnosis Date   • Degenerative disorder of bone    • Diverticulitis    • GERD (gastroesophageal reflux disease)    • Peptic ulcer disease    • Thyroid cancer (CMS/HCC)    • Thyroid disorder    • TIA (transient ischemic attack)        Past Surgical History:   Procedure Laterality Date   • COLONOSCOPY     • COLONOSCOPY N/A 11/22/2019    Procedure: COLONOSCOPY INTO CECUM & TERMINAL ILEUM WITH HOT SNARE POLYPECTOMIES;  Surgeon: Damon Barnes MD;  Location: [facility] ENDOSCOPY;  Service: Gastroenterology   • CYST REMOVAL      bronchial   • ENDOSCOPY N/A 11/22/2019    Procedure: ESOPHAGOGASTRODUODENOSCOPY WITH COLD BIOPSIES;  Surgeon: Damon Barnes MD;  Location: [facility] ENDOSCOPY;  Service: Gastroenterology   • THYROID BIOPSY     • UPPER GASTROINTESTINAL ENDOSCOPY  01/2016    z-line irreg, [place] Grade A reflux, bx; HH, esophageal motility disorder       Scheduled Meds:     Continuous Infusions: No current facility-administered medications for this visit.      PRN Meds:     Allergies   Allergen Reactions   • Penicillins Anaphylaxis     Hives  CONVULSIONS        Social History     Socioeconomic History   • Marital status:      Spouse name: Not on file   • Number of children: Not on file   • Years of education: Not on file   • Highest education level: Not on file   Tobacco Use   • Smoking status: Never Smoker   • Smokeless tobacco: Never Used   Substance and Sexual Activity   • Alcohol use: No   • Drug use: Defer   • Sexual activity: Defer       Family History   Problem Relation Age of Onset   • Cancer Other    • Diabetes Other    • Hypertension Other    • Heart disease Other        Review of Systems   Constitutional: Negative for activity change, appetite change, fatigue, fever and unexpected weight change.   HENT: Negative for trouble swallowing.    Respiratory: Negative for apnea, cough, choking, chest tightness, shortness of breath and wheezing.    Cardiovascular: Negative for chest pain, palpitations and leg swelling.   Gastrointestinal: Negative for abdominal distention, abdominal pain, anal bleeding, blood in stool, constipation, diarrhea, nausea, rectal pain and vomiting.        + Dyspepsia off of PPI therapy       Vitals:    09/20/21 1024   Temp: 95.7 °F (35.4 °C)       Physical Exam  Constitutional:       Appearance: She is well-developed.   Abdominal:      General: Bowel sounds are normal. There is no distension.      Palpations: Abdomen is soft. There is no mass.      Tenderness: There is no abdominal tenderness. There is no guarding.      Hernia: No hernia is present.   Skin:     General: Skin is warm and dry.      Capillary Refill: Capillary refill takes less than 2 seconds.   Neurological:      Mental Status: She is alert and oriented to person, place, and time.   Psychiatric:         Behavior: Behavior normal.       Assessment    1. Gastroesophageal reflux disease, unspecified whether esophagitis present    2. Hiatal hernia    3. Personal history of colonic polyps    Plan    Resume PPI therapy at prior dosage.  Prescription for twice daily dosing  sent to her pharmacy.  Continue antireflux diet.  Discussed reducing to once a day once or current symptoms resolved.  Colonoscopy for surveillance purposes 2024.  Return to clinic 1 year.         OCTAVIA Todd  [facility]  [address] [place] [address]  Office: [phone]

## 2021-10-12 ENCOUNTER — TELEPHONE (OUTPATIENT)
Dept: GASTROENTEROLOGY | Facility: CLINIC | Age: 46
End: 2021-10-12

## 2021-10-12 ENCOUNTER — OFFICE VISIT (OUTPATIENT)
Dept: GASTROENTEROLOGY | Facility: CLINIC | Age: 46
End: 2021-10-12

## 2021-10-12 VITALS — HEIGHT: 64 IN | BODY MASS INDEX: 49.54 KG/M2 | WEIGHT: 290.2 LBS | TEMPERATURE: 97.7 F

## 2021-10-12 DIAGNOSIS — R11.2 NAUSEA AND VOMITING, INTRACTABILITY OF VOMITING NOT SPECIFIED, UNSPECIFIED VOMITING TYPE: ICD-10-CM

## 2021-10-12 DIAGNOSIS — K44.9 HIATAL HERNIA: Primary | ICD-10-CM

## 2021-10-12 PROCEDURE — 99214 OFFICE O/P EST MOD 30 MIN: CPT | Performed by: INTERNAL MEDICINE

## 2021-10-12 RX ORDER — GABAPENTIN 100 MG/1
100 CAPSULE ORAL 3 TIMES DAILY
COMMUNITY
Start: 2021-07-29

## 2021-10-12 NOTE — PROGRESS NOTES
Chief Complaint   Patient presents with   • Vomiting       Lucila Amaya is a  46 y.o. female here for a follow up visit for nausea and vomiting.    HPI     Patient 46-year-old female with history of degenerative disc disease, GERD and hypothyroid presenting with 3 weeks of nausea and vomiting. Patient reports issues in the past with swallowing last endoscopy November 2019 showed a completely patent esophagus with negative esophagram. Patient reports late September developed low-grade fevers with myalgias as well as progressive nausea and vomiting. Patient initially thought to possibly have Covid was tested for that as well as strep and flu all of which were negative. Patient seen in the ER for symptoms reports CT performed was negative for infection or inflammation. Patient given IV fluids and felt better for a day but then symptoms returned. Patient here for further recommendations.    Past Medical History:   Diagnosis Date   • Degenerative disorder of bone    • Diverticulitis    • GERD (gastroesophageal reflux disease)    • Peptic ulcer disease    • Thyroid cancer (HCC)    • Thyroid disorder    • TIA (transient ischemic attack)          Current Outpatient Medications:   •  calcitriol (ROCALTROL) 0.5 MCG capsule, Take 0.25 mcg by mouth Daily., Disp: , Rfl:   •  gabapentin (NEURONTIN) 100 MG capsule, Take 100 mg by mouth 4 (Four) Times a Day., Disp: , Rfl:   •  levothyroxine (SYNTHROID) 150 MCG tablet, Take 125 mcg by mouth Daily., Disp: , Rfl:   •  liothyronine (CYTOMEL) 5 MCG tablet, Take 5 mcg by mouth Daily., Disp: , Rfl:   •  pantoprazole (PROTONIX) 40 MG EC tablet, Take 1 tablet by mouth 2 (two) times a day., Disp: 180 tablet, Rfl: 3  •  progesterone (PROMETRIUM) 200 MG capsule, Take 200 mg by mouth Daily., Disp: , Rfl:   •  traMADol (ULTRAM) 50 MG tablet, TK 1 T PO BID FOR 30 DAYS, Disp: , Rfl: 1  •  Triamterene-HCTZ (MAXZIDE PO), Take 37.5 mg by mouth Daily. 75 mg daily, Disp: , Rfl:   •  gabapentin  (NEURONTIN) 300 MG capsule, TK 1 C PO QHS, Disp: , Rfl: 1    Allergies   Allergen Reactions   • Penicillins Anaphylaxis     Hives  CONVULSIONS       Social History     Socioeconomic History   • Marital status:    Tobacco Use   • Smoking status: Never Smoker   • Smokeless tobacco: Never Used   Substance and Sexual Activity   • Alcohol use: No   • Drug use: Defer   • Sexual activity: Defer       Family History   Problem Relation Age of Onset   • Cancer Other    • Diabetes Other    • Hypertension Other    • Heart disease Other        Review of Systems   Constitutional: Negative.    HENT: Positive for sore throat and trouble swallowing.    Respiratory: Negative.    Cardiovascular: Negative.    Gastrointestinal: Positive for nausea and vomiting. Negative for abdominal distention, abdominal pain, anal bleeding, blood in stool, constipation and diarrhea.   Musculoskeletal: Negative.    Skin: Negative.    Hematological: Negative.        Vitals:    10/12/21 1413   Temp: 97.7 °F (36.5 °C)       Physical Exam  Vitals and nursing note reviewed.   Constitutional:       Appearance: She is well-developed.   HENT:      Head: Normocephalic and atraumatic.   Eyes:      General: No scleral icterus.     Pupils: Pupils are equal, round, and reactive to light.   Cardiovascular:      Rate and Rhythm: Normal rate and regular rhythm.      Heart sounds: Normal heart sounds. No murmur heard.  No friction rub. No gallop.    Pulmonary:      Effort: Pulmonary effort is normal.      Breath sounds: Normal breath sounds. No wheezing or rales.   Abdominal:      General: Bowel sounds are normal. There is no distension or abdominal bruit.      Palpations: Abdomen is soft. Abdomen is not rigid. There is no shifting dullness, fluid wave, mass or pulsatile mass.      Tenderness: There is no abdominal tenderness. There is no guarding.      Hernia: No hernia is present.   Musculoskeletal:         General: No swelling or tenderness. Normal range of  motion.   Skin:     General: Skin is warm and dry.      Coloration: Skin is not jaundiced.      Findings: No rash.   Neurological:      General: No focal deficit present.      Mental Status: She is alert and oriented to person, place, and time.   Psychiatric:         Behavior: Behavior normal.         Thought Content: Thought content normal.         No visits with results within 2 Month(s) from this visit.   Latest known visit with results is:   Admission on 11/22/2019, Discharged on 11/22/2019   Component Date Value Ref Range Status   • HCG, Urine, QL 11/22/2019 Negative  Negative Final   • Lot Number 11/22/2019 [id number]   Final   • Internal Positive Control 11/22/2019 Positive   Final   • Internal Negative Control 11/22/2019 Negative   Final   • Case Report 11/22/2019    Final                    Value:Surgical Pathology Report                         Case: [id number]                                  Authorizing Provider:  Damon Barnes MD    Collected:           11/22/2019 10:45 AM          Ordering Location:     [facility]  Received:            11/22/2019 12:01 PM                                 ENDO SUITES                                                                  Pathologist:           Ortiz Angela MD                                                         Specimens:   1) - Large Intestine, Right / Ascending Colon, ASCENDING COLON POLYP                                2) - Large Intestine, Rectum, RECTAL POLYP                                                          3) - Gastric, Antrum, ANTRAL BIOPSIES                                                     • Final Diagnosis 11/22/2019    Final                    Value:This result contains rich text formatting which cannot be displayed here.   • Comment 11/22/2019    Final                    Value:This result contains rich text formatting which cannot be displayed here.   • Gross Description 11/22/2019    Final                     Value:This result contains rich text formatting which cannot be displayed here.   • Microscopic Description 11/22/2019    Final                    Value:This result contains rich text formatting which cannot be displayed here.       Diagnoses and all orders for this visit:    1. Hiatal hernia (Primary)  -     Case Request; Standing  -     Case Request    2. Nausea and vomiting, intractability of vomiting not specified, unspecified vomiting type  -     Case Request; Standing  -     Case Request      Patient 46-year-old female with history of diverticulitis and GERD presenting with 3 weeks of persistent nausea and vomiting. Patient reports can tolerate liquids and using protein shakes right now but if he tries to eat solid foods it feels like it just lays in her stomach and vomits it up even a day later. Patient reports this started about 3 weeks ago with fever and myalgias tested for Covid as well as strep and the flu all negative. Patient reports since then about a 13 pound weight loss noted. Will recommend EGD to make sure there is no gastric outlet issue, discussed with Dr. Stevens and he will perform an EGD to evaluate. Patient to continue taking the Phenergan and Zofran as needed for the nausea and maintain on proton pump inhibitor for the acid reflux.

## 2021-10-22 ENCOUNTER — APPOINTMENT (OUTPATIENT)
Dept: CT IMAGING | Facility: HOSPITAL | Age: 46
End: 2021-10-22

## 2021-10-22 ENCOUNTER — HOSPITAL ENCOUNTER (EMERGENCY)
Facility: HOSPITAL | Age: 46
Discharge: HOME | End: 2021-10-22
Attending: EMERGENCY MEDICINE | Admitting: EMERGENCY MEDICINE

## 2021-10-22 VITALS
WEIGHT: 287 LBS | DIASTOLIC BLOOD PRESSURE: 90 MMHG | OXYGEN SATURATION: 95 % | HEART RATE: 72 BPM | BODY MASS INDEX: 49 KG/M2 | HEIGHT: 64 IN | TEMPERATURE: 97.3 F | SYSTOLIC BLOOD PRESSURE: 145 MMHG | RESPIRATION RATE: 16 BRPM

## 2021-10-22 DIAGNOSIS — Z01.818 OTHER SPECIFIED PRE-OPERATIVE EXAMINATION: ICD-10-CM

## 2021-10-22 DIAGNOSIS — R11.2 NAUSEA AND VOMITING, INTRACTABILITY OF VOMITING NOT SPECIFIED, UNSPECIFIED VOMITING TYPE: Primary | ICD-10-CM

## 2021-10-22 DIAGNOSIS — E86.0 DEHYDRATION: ICD-10-CM

## 2021-10-22 LAB
ALBUMIN SERPL-MCNC: 4.2 G/DL (ref 3.5–5.2)
ALBUMIN/GLOB SERPL: 1.2 G/DL
ALP SERPL-CCNC: 67 U/L (ref 39–117)
ALT SERPL W P-5'-P-CCNC: 9 U/L (ref 1–33)
ANION GAP SERPL CALCULATED.3IONS-SCNC: 11.6 MMOL/L (ref 5–15)
AST SERPL-CCNC: 13 U/L (ref 1–32)
BASOPHILS # BLD AUTO: 0.07 10*3/MM3 (ref 0–0.2)
BASOPHILS NFR BLD AUTO: 1.2 % (ref 0–1.5)
BILIRUB SERPL-MCNC: 0.6 MG/DL (ref 0–1.2)
BILIRUB UR QL STRIP: NEGATIVE
BUN SERPL-MCNC: 11 MG/DL (ref 6–20)
BUN/CREAT SERPL: 7 (ref 7–25)
CALCIUM SPEC-SCNC: 9.8 MG/DL (ref 8.6–10.5)
CHLORIDE SERPL-SCNC: 98 MMOL/L (ref 98–107)
CLARITY UR: CLEAR
CO2 SERPL-SCNC: 28.4 MMOL/L (ref 22–29)
COLOR UR: YELLOW
CREAT SERPL-MCNC: 1.58 MG/DL (ref 0.57–1)
DEPRECATED RDW RBC AUTO: 38.9 FL (ref 37–54)
EOSINOPHIL # BLD AUTO: 0.11 10*3/MM3 (ref 0–0.4)
EOSINOPHIL NFR BLD AUTO: 1.8 % (ref 0.3–6.2)
ERYTHROCYTE [DISTWIDTH] IN BLOOD BY AUTOMATED COUNT: 13.7 % (ref 12.3–15.4)
GFR SERPL CREATININE-BSD FRML MDRD: 35 ML/MIN/1.73
GLOBULIN UR ELPH-MCNC: 3.4 GM/DL
GLUCOSE SERPL-MCNC: 95 MG/DL (ref 65–99)
GLUCOSE UR STRIP-MCNC: NEGATIVE MG/DL
HCT VFR BLD AUTO: 39.1 % (ref 34–46.6)
HGB BLD-MCNC: 13.7 G/DL (ref 12–15.9)
HGB UR QL STRIP.AUTO: NEGATIVE
IMM GRANULOCYTES # BLD AUTO: 0.02 10*3/MM3 (ref 0–0.05)
IMM GRANULOCYTES NFR BLD AUTO: 0.3 % (ref 0–0.5)
KETONES UR QL STRIP: NEGATIVE
LEUKOCYTE ESTERASE UR QL STRIP.AUTO: NEGATIVE
LIPASE SERPL-CCNC: 21 U/L (ref 13–60)
LYMPHOCYTES # BLD AUTO: 2.28 10*3/MM3 (ref 0.7–3.1)
LYMPHOCYTES NFR BLD AUTO: 38.1 % (ref 19.6–45.3)
MAGNESIUM SERPL-MCNC: 1.8 MG/DL (ref 1.6–2.6)
MCH RBC QN AUTO: 27.7 PG (ref 26.6–33)
MCHC RBC AUTO-ENTMCNC: 35 G/DL (ref 31.5–35.7)
MCV RBC AUTO: 79.1 FL (ref 79–97)
MONOCYTES # BLD AUTO: 0.51 10*3/MM3 (ref 0.1–0.9)
MONOCYTES NFR BLD AUTO: 8.5 % (ref 5–12)
NEUTROPHILS NFR BLD AUTO: 3 10*3/MM3 (ref 1.7–7)
NEUTROPHILS NFR BLD AUTO: 50.1 % (ref 42.7–76)
NITRITE UR QL STRIP: NEGATIVE
NRBC BLD AUTO-RTO: 0 /100 WBC (ref 0–0.2)
PH UR STRIP.AUTO: 8.5 [PH] (ref 5–8)
PLATELET # BLD AUTO: 294 10*3/MM3 (ref 140–450)
PMV BLD AUTO: 11.5 FL (ref 6–12)
POTASSIUM SERPL-SCNC: 3.1 MMOL/L (ref 3.5–5.2)
PROT SERPL-MCNC: 7.6 G/DL (ref 6–8.5)
PROT UR QL STRIP: NEGATIVE
RBC # BLD AUTO: 4.94 10*6/MM3 (ref 3.77–5.28)
SODIUM SERPL-SCNC: 138 MMOL/L (ref 136–145)
SP GR UR STRIP: <=1.005 (ref 1–1.03)
T4 FREE SERPL-MCNC: 1.61 NG/DL (ref 0.93–1.7)
TSH SERPL DL<=0.05 MIU/L-ACNC: 2.05 UIU/ML (ref 0.27–4.2)
UROBILINOGEN UR QL STRIP: ABNORMAL
WBC # BLD AUTO: 5.99 10*3/MM3 (ref 3.4–10.8)

## 2021-10-22 PROCEDURE — 84439 ASSAY OF FREE THYROXINE: CPT | Performed by: EMERGENCY MEDICINE

## 2021-10-22 PROCEDURE — 84443 ASSAY THYROID STIM HORMONE: CPT | Performed by: EMERGENCY MEDICINE

## 2021-10-22 PROCEDURE — 99283 EMERGENCY DEPT VISIT LOW MDM: CPT

## 2021-10-22 PROCEDURE — 80053 COMPREHEN METABOLIC PANEL: CPT | Performed by: EMERGENCY MEDICINE

## 2021-10-22 PROCEDURE — 83735 ASSAY OF MAGNESIUM: CPT | Performed by: EMERGENCY MEDICINE

## 2021-10-22 PROCEDURE — 96374 THER/PROPH/DIAG INJ IV PUSH: CPT

## 2021-10-22 PROCEDURE — 25010000002 METOCLOPRAMIDE PER 10 MG: Performed by: EMERGENCY MEDICINE

## 2021-10-22 PROCEDURE — 83690 ASSAY OF LIPASE: CPT | Performed by: EMERGENCY MEDICINE

## 2021-10-22 PROCEDURE — 25010000002 IOPAMIDOL 61 % SOLUTION: Performed by: EMERGENCY MEDICINE

## 2021-10-22 PROCEDURE — 74177 CT ABD & PELVIS W/CONTRAST: CPT

## 2021-10-22 PROCEDURE — 81003 URINALYSIS AUTO W/O SCOPE: CPT | Performed by: EMERGENCY MEDICINE

## 2021-10-22 PROCEDURE — 85025 COMPLETE CBC W/AUTO DIFF WBC: CPT | Performed by: EMERGENCY MEDICINE

## 2021-10-22 RX ORDER — METOCLOPRAMIDE HYDROCHLORIDE 5 MG/ML
10 INJECTION INTRAMUSCULAR; INTRAVENOUS ONCE
Status: COMPLETED | OUTPATIENT
Start: 2021-10-22 | End: 2021-10-22

## 2021-10-22 RX ORDER — METOCLOPRAMIDE 10 MG/1
10 TABLET ORAL 4 TIMES DAILY PRN
Qty: 30 TABLET | Refills: 0 | Status: SHIPPED | OUTPATIENT
Start: 2021-10-22 | End: 2021-11-11

## 2021-10-22 RX ADMIN — IOPAMIDOL 85 ML: 612 INJECTION, SOLUTION INTRAVENOUS at 14:07

## 2021-10-22 RX ADMIN — METOCLOPRAMIDE HYDROCHLORIDE 10 MG: 5 INJECTION INTRAMUSCULAR; INTRAVENOUS at 11:41

## 2021-10-22 RX ADMIN — SODIUM CHLORIDE, POTASSIUM CHLORIDE, SODIUM LACTATE AND CALCIUM CHLORIDE 1000 ML: 600; 310; 30; 20 INJECTION, SOLUTION INTRAVENOUS at 11:33

## 2021-10-22 RX ADMIN — SODIUM CHLORIDE, POTASSIUM CHLORIDE, SODIUM LACTATE AND CALCIUM CHLORIDE 1000 ML: 600; 310; 30; 20 INJECTION, SOLUTION INTRAVENOUS at 15:00

## 2021-10-22 NOTE — DISCHARGE INSTRUCTIONS
Take medications as prescribed, avoid taking any Zofran or Phenergan while taking the new nausea medications.  PCP and GI follow-up as discussed, ED return for worsening symptoms as needed.

## 2021-10-22 NOTE — ED PROVIDER NOTES
EMERGENCY DEPARTMENT ENCOUNTER    Room Number:  21/21  Date of encounter:  10/22/2021  PCP: Josette Ingram APRN  Historian: Patient,       HPI:  Chief Complaint: Persistent nausea and vomiting  A complete HPI/ROS/PMH/PSH/SH/FH are unobtainable due to: None    Context: Lucila Amaya is a 46 y.o. female who presents to the ED via private vehicle for evaluation for 6 weeks of ongoing nausea and vomiting worse over the last week or so.  Previously been able to tolerate more liquids with protein shakes and smoothies, over the last week has barely been able to tolerate any clear liquids at all.  States that she does still urinate multiple times a day.  Reports increasing weakness and fatigue.  No significant abdominal pains, does have some discomfort when vomiting.  Has seen GI, has an EGD scheduled for early November.  History of hiatal hernia.  Patient with no diarrhea, fevers, chills, cough, chest pain or shortness of breath.      MEDICAL RECORD REVIEW    GI note reviewed with Dr. Barnes, plan for EGD    PAST MEDICAL HISTORY  Active Ambulatory Problems     Diagnosis Date Noted   • Gastroesophageal reflux disease with esophagitis 04/26/2018   • Other dysphagia 10/23/2019   • Screening for colon cancer 10/23/2019   • Left knee pain 09/26/2020   • Hiatal hernia 10/12/2021   • Nausea and vomiting 10/12/2021     Resolved Ambulatory Problems     Diagnosis Date Noted   • No Resolved Ambulatory Problems     Past Medical History:   Diagnosis Date   • Degenerative disorder of bone    • Diverticulitis    • GERD (gastroesophageal reflux disease)    • Hypertension    • Peptic ulcer disease    • Thyroid cancer (HCC)    • Thyroid disorder    • TIA (transient ischemic attack)          PAST SURGICAL HISTORY  Past Surgical History:   Procedure Laterality Date   • COLONOSCOPY     • COLONOSCOPY N/A 11/22/2019    Procedure: COLONOSCOPY INTO CECUM & TERMINAL ILEUM WITH HOT SNARE POLYPECTOMIES;  Surgeon: Damon Barnes,  MD;  Location: [facility] ENDOSCOPY;  Service: Gastroenterology   • CYST REMOVAL      bronchial   • ENDOSCOPY N/A 11/22/2019    Procedure: ESOPHAGOGASTRODUODENOSCOPY WITH COLD BIOPSIES;  Surgeon: Damon Barnes MD;  Location: [facility] ENDOSCOPY;  Service: Gastroenterology   • THYROID BIOPSY     • UPPER GASTROINTESTINAL ENDOSCOPY  01/2016    z-line irreg, [place] Grade A reflux, bx; HH, esophageal motility disorder         FAMILY HISTORY  Family History   Problem Relation Age of Onset   • Cancer Other    • Diabetes Other    • Hypertension Other    • Heart disease Other          SOCIAL HISTORY  Social History     Socioeconomic History   • Marital status:    Tobacco Use   • Smoking status: Never Smoker   • Smokeless tobacco: Never Used   Substance and Sexual Activity   • Alcohol use: No   • Drug use: Defer   • Sexual activity: Defer         ALLERGIES  Penicillins        REVIEW OF SYSTEMS  Review of Systems     All systems reviewed and negative except for those discussed in HPI.       PHYSICAL EXAM    I have reviewed the triage vital signs and nursing notes.    ED Triage Vitals   Temp Heart Rate Resp BP SpO2   10/22/21 1059 10/22/21 1059 10/22/21 1059 10/22/21 1120 10/22/21 1059   97.3 °F (36.3 °C) 107 18 (!) 199/113 98 %      Temp src Heart Rate Source Patient Position BP Location FiO2 (%)   -- -- -- -- --              Physical Exam  General: Awake, alert, no acute distress, nontoxic, nondiaphoretic  HEENT: Mucous membranes tacky, atraumatic, EOMI  Neck: Full ROM  Pulm: Symmetric chest rise, nonlabored, lungs CTAB  Cardiovascular: Regular rate and rhythm of 76 on exam, intact distal pulses  GI: Soft, minimal epigastric tenderness to palpation, nondistended, no rebound, no guarding, bowel sounds present  MSK: Full ROM, no deformity  Skin: Warm, dry  Neuro: Alert and oriented x 3, GCS 15, moving all extremities, no focal deficits  Psych: Calm, cooperative      N95, protective eye goggles, and gloves used during this  encounter. Patient in surgical mask.      LAB RESULTS  Recent Results (from the past 24 hour(s))   Comprehensive Metabolic Panel    Collection Time: 10/22/21 11:30 AM    Specimen: Blood   Result Value Ref Range    Glucose 95 65 - 99 mg/dL    BUN 11 6 - 20 mg/dL    Creatinine 1.58 (H) 0.57 - 1.00 mg/dL    Sodium 138 136 - 145 mmol/L    Potassium 3.1 (L) 3.5 - 5.2 mmol/L    Chloride 98 98 - 107 mmol/L    CO2 28.4 22.0 - 29.0 mmol/L    Calcium 9.8 8.6 - 10.5 mg/dL    Total Protein 7.6 6.0 - 8.5 g/dL    Albumin 4.20 3.50 - 5.20 g/dL    ALT (SGPT) 9 1 - 33 U/L    AST (SGOT) 13 1 - 32 U/L    Alkaline Phosphatase 67 39 - 117 U/L    Total Bilirubin 0.6 0.0 - 1.2 mg/dL    eGFR Non African Amer 35 (L) >60 mL/min/1.73    Globulin 3.4 gm/dL    A/G Ratio 1.2 g/dL    BUN/Creatinine Ratio 7.0 7.0 - 25.0    Anion Gap 11.6 5.0 - 15.0 mmol/L   Lipase    Collection Time: 10/22/21 11:30 AM    Specimen: Blood   Result Value Ref Range    Lipase 21 13 - 60 U/L   Magnesium    Collection Time: 10/22/21 11:30 AM    Specimen: Blood   Result Value Ref Range    Magnesium 1.8 1.6 - 2.6 mg/dL   TSH    Collection Time: 10/22/21 11:30 AM    Specimen: Blood   Result Value Ref Range    TSH 2.050 0.270 - 4.200 uIU/mL   T4, Free    Collection Time: 10/22/21 11:30 AM    Specimen: Blood   Result Value Ref Range    Free T4 1.61 0.93 - 1.70 ng/dL   CBC Auto Differential    Collection Time: 10/22/21 11:30 AM    Specimen: Blood   Result Value Ref Range    WBC 5.99 3.40 - 10.80 10*3/mm3    RBC 4.94 3.77 - 5.28 10*6/mm3    Hemoglobin 13.7 12.0 - 15.9 g/dL    Hematocrit 39.1 34.0 - 46.6 %    MCV 79.1 79.0 - 97.0 fL    MCH 27.7 26.6 - 33.0 pg    MCHC 35.0 31.5 - 35.7 g/dL    RDW 13.7 12.3 - 15.4 %    RDW-SD 38.9 37.0 - 54.0 fl    MPV 11.5 6.0 - 12.0 fL    Platelets 294 140 - 450 10*3/mm3    Neutrophil % 50.1 42.7 - 76.0 %    Lymphocyte % 38.1 19.6 - 45.3 %    Monocyte % 8.5 5.0 - 12.0 %    Eosinophil % 1.8 0.3 - 6.2 %    Basophil % 1.2 0.0 - 1.5 %    Immature  Grans % 0.3 0.0 - 0.5 %    Neutrophils, Absolute 3.00 1.70 - 7.00 10*3/mm3    Lymphocytes, Absolute 2.28 0.70 - 3.10 10*3/mm3    Monocytes, Absolute 0.51 0.10 - 0.90 10*3/mm3    Eosinophils, Absolute 0.11 0.00 - 0.40 10*3/mm3    Basophils, Absolute 0.07 0.00 - 0.20 10*3/mm3    Immature Grans, Absolute 0.02 0.00 - 0.05 10*3/mm3    nRBC 0.0 0.0 - 0.2 /100 WBC   Urinalysis With Microscopic If Indicated (No Culture) - Urine, Clean Catch    Collection Time: 10/22/21 12:55 PM    Specimen: Urine, Clean Catch   Result Value Ref Range    Color, UA Yellow Yellow, Straw    Appearance, UA Clear Clear    pH, UA 8.5 (H) 5.0 - 8.0    Specific Gravity, UA <=1.005 1.005 - 1.030    Glucose, UA Negative Negative    Ketones, UA Negative Negative    Bilirubin, UA Negative Negative    Blood, UA Negative Negative    Protein, UA Negative Negative    Leuk Esterase, UA Negative Negative    Nitrite, UA Negative Negative    Urobilinogen, UA 0.2 E.U./dL 0.2 - 1.0 E.U./dL       Ordered the above labs and independently reviewed the results.        RADIOLOGY  CT Abdomen Pelvis With Contrast    Result Date: 10/22/2021  CT ABDOMEN AND PELVIS WITH IV CONTRAST  HISTORY: Persistent nausea vomiting  TECHNIQUE: Radiation dose reduction techniques were utilized, including automated exposure control and exposure modulation based on body size. 3 mm images were obtained through the abdomen and pelvis after the administration of IV contrast.  COMPARISON: None  FINDINGS:  Sub-6 mm pulmonary nodule is present within the right middle lobe. There is a small hiatal hernia. No findings of small bowel obstruction are present. The appendix is unremarkable.  1 cm right hepatic hypodense lesion measures cystic density and is likely benign. The gallbladder, pancreas, spleen, adrenal glands and kidneys have an unremarkable postcontrast CT appearance. There is no hydronephrosis.  No abdominopelvic adenopathy by size criteria is present. The bladder is unremarkable  postcontrast CT appearance for its degree of distention. No free intraperitoneal fluid or air is present.  There are no suspicious lytic or blastic osseous lesions.      1.  No CT findings of acute intraabdominal pathology. 2.  Sub-6 mm pulmonary nodule in the right middle lobe. Follow-up with chest CT in 12 months should be considered to ensure stability per Fleischner criteria. 3.  Small hiatal hernia. 4.  Other findings as above.  This report was finalized on 10/22/2021 2:35 PM by Dr. lAberto Brannon M.D.        I ordered the above noted radiological studies. Reviewed by me.  See dictation for official radiology interpretation.      PROCEDURES    Procedures      MEDICATIONS GIVEN IN ER    Medications   metoclopramide (REGLAN) injection 10 mg (10 mg Intravenous Given 10/22/21 1141)   lactated ringers bolus 1,000 mL (0 mL Intravenous Stopped 10/22/21 1657)   iopamidol (ISOVUE-300) 61 % injection 100 mL (85 mL Intravenous Given by Other 10/22/21 1407)   lactated ringers bolus 1,000 mL (0 mL Intravenous Stopped 10/22/21 1657)         PROGRESS, DATA ANALYSIS, CONSULTS, AND MEDICAL DECISION MAKING    All labs have been independently reviewed by me.  All radiology studies have been reviewed by me and discussed with radiologist dictating the report.   EKG's independently viewed and interpreted by me.  Discussion below represents my analysis of pertinent findings related to patient's condition, differential diagnosis, treatment plan and final disposition.    Initial concern for renal failure, electrolyte abnormalities, anemia, gastroparesis, cholelithiasis, cholecystitis, colitis, diverticulitis, pancreatitis, among others.    ED Course as of 10/22/21 1715   Fri Oct 22, 2021   1218 WBC: 5.99 [DC]   1218 Hemoglobin: 13.7 [DC]   1218 Lipase: 21 [DC]   1218 Magnesium: 1.8 [DC]   1218 Glucose: 95 [DC]   1218 BUN: 11 [DC]   1218 Creatinine(!): 1.58  1.1 one month ago [DC]   1218 Potassium(!): 3.1 [DC]   1313 Ketones, UA:  Negative [DC]   2926 Patient updated on reassuring CT scan today, I did discuss with her some mild renal insufficiency but otherwise no acute concerning emergent findings.  She states that her nausea is better after the IV Reglan and I think trialing a couple days of Reglan at home for a promotility agent to be a reasonable option.  They are willing to try this to stay out of the hospital.  I did discuss that things are not improving or worsening despite the Reglan that she will need to come in for reevaluation and probable hospitalization.  I did discuss hospital stay at this point but I think the better option and she is agreeable to try would be trialing home Reglan for a few days. [DC]      ED Course User Index  [DC] Chet Winters MD       AS OF 17:15 EDT VITALS:    BP - 145/90  HR - 72  TEMP - 97.3 °F (36.3 °C)  02 SATS - 95%        DIAGNOSIS  Final diagnoses:   Nausea and vomiting, intractability of vomiting not specified, unspecified vomiting type   Dehydration         DISPOSITION  DISCHARGE    Patient discharged in stable condition.    Reviewed implications of results, diagnosis, meds, responsibility to follow up, warning signs and symptoms of possible worsening, potential complications and reasons to return to ER.    Patient/Family voiced understanding of above instructions.    Discussed plan for discharge, as there is no emergent indication for admission. Patient referred to primary care provider for BP management due to today's BP. Pt/family is agreeable and understands need for follow up and repeat testing.  Pt is aware that discharge does not mean that nothing is wrong but it indicates no emergency is present that requires admission and they must continue care with follow-up as given below or physician of their choice.     FOLLOW-UP  [facility] Emergency Department  [address] [facility] [address] [phone]    As needed, If symptoms worsen    Josette Ingram, APRN  [address]  [facility] [address] [phone]    Schedule an appointment as soon as possible for a visit   As needed    [facility] GASTROENTEROLOGY  [address] [address] [phone]  Go to   As scheduled         Medication List      New Prescriptions    metoclopramide 10 MG tablet  Commonly known as: REGLAN  Take 1 tablet by mouth 4 (Four) Times a Day As Needed (nausea/vomiting).           Where to Get Your Medications      These medications were sent to [id number] - [place], [place] - [address] OF [place] [address] [address] - [phone]  - [phone]   [address], [facility] [address]    Phone: [phone]   · metoclopramide 10 MG tablet                    Chet Winters MD  10/22/21 6686

## 2021-10-22 NOTE — ED NOTES
Pt arrival to room 21 with complaint of persistent N/V for 5 weeks.PPE per protocol utilized.       Adrianne Sharp RN  10/22/21 5576

## 2021-10-22 NOTE — ED NOTES
Patient states she has had nausea and vomiting x 6 weeks. Patient has been seen for this multiple times and has a EGD scheduled for November but could not wait that long.      Annette Wise, IRVING  10/22/21 5158

## 2021-11-03 ENCOUNTER — TRANSCRIBE ORDERS (OUTPATIENT)
Dept: GASTROENTEROLOGY | Facility: CLINIC | Age: 46
End: 2021-11-03

## 2021-11-03 DIAGNOSIS — Z01.818 OTHER SPECIFIED PRE-OPERATIVE EXAMINATION: Primary | ICD-10-CM

## 2021-11-10 ENCOUNTER — LAB (OUTPATIENT)
Dept: LAB | Facility: HOSPITAL | Age: 46
End: 2021-11-10

## 2021-11-10 LAB — SARS-COV-2 ORF1AB RESP QL NAA+PROBE: NOT DETECTED

## 2021-11-10 PROCEDURE — U0004 COV-19 TEST NON-CDC HGH THRU: HCPCS | Performed by: INTERNAL MEDICINE

## 2021-11-10 PROCEDURE — C9803 HOPD COVID-19 SPEC COLLECT: HCPCS | Performed by: INTERNAL MEDICINE

## 2021-11-12 ENCOUNTER — ANESTHESIA (OUTPATIENT)
Dept: GASTROENTEROLOGY | Facility: HOSPITAL | Age: 46
End: 2021-11-12

## 2021-11-12 ENCOUNTER — ANESTHESIA EVENT (OUTPATIENT)
Dept: GASTROENTEROLOGY | Facility: HOSPITAL | Age: 46
End: 2021-11-12

## 2021-11-12 ENCOUNTER — HOSPITAL ENCOUNTER (OUTPATIENT)
Facility: HOSPITAL | Age: 46
Setting detail: HOSPITAL OUTPATIENT SURGERY
Discharge: HOME | End: 2021-11-12
Attending: INTERNAL MEDICINE | Admitting: INTERNAL MEDICINE

## 2021-11-12 VITALS
RESPIRATION RATE: 16 BRPM | HEIGHT: 64 IN | DIASTOLIC BLOOD PRESSURE: 98 MMHG | SYSTOLIC BLOOD PRESSURE: 161 MMHG | OXYGEN SATURATION: 98 % | BODY MASS INDEX: 50.02 KG/M2 | WEIGHT: 293 LBS | HEART RATE: 88 BPM

## 2021-11-12 DIAGNOSIS — R11.2 NAUSEA AND VOMITING, INTRACTABILITY OF VOMITING NOT SPECIFIED, UNSPECIFIED VOMITING TYPE: ICD-10-CM

## 2021-11-12 DIAGNOSIS — K44.9 HIATAL HERNIA: ICD-10-CM

## 2021-11-12 PROCEDURE — 88305 TISSUE EXAM BY PATHOLOGIST: CPT | Performed by: INTERNAL MEDICINE

## 2021-11-12 PROCEDURE — 87081 CULTURE SCREEN ONLY: CPT | Performed by: INTERNAL MEDICINE

## 2021-11-12 PROCEDURE — 25010000002 PROPOFOL 10 MG/ML EMULSION: Performed by: NURSE ANESTHETIST, CERTIFIED REGISTERED

## 2021-11-12 PROCEDURE — 43239 EGD BIOPSY SINGLE/MULTIPLE: CPT | Performed by: INTERNAL MEDICINE

## 2021-11-12 PROCEDURE — S0260 H&P FOR SURGERY: HCPCS | Performed by: INTERNAL MEDICINE

## 2021-11-12 RX ORDER — SODIUM CHLORIDE 0.9 % (FLUSH) 0.9 %
10 SYRINGE (ML) INJECTION AS NEEDED
Status: DISCONTINUED | OUTPATIENT
Start: 2021-11-12 | End: 2021-11-12 | Stop reason: HOSPADM

## 2021-11-12 RX ORDER — SODIUM CHLORIDE, SODIUM LACTATE, POTASSIUM CHLORIDE, CALCIUM CHLORIDE 600; 310; 30; 20 MG/100ML; MG/100ML; MG/100ML; MG/100ML
1000 INJECTION, SOLUTION INTRAVENOUS CONTINUOUS
Status: DISCONTINUED | OUTPATIENT
Start: 2021-11-12 | End: 2021-11-12 | Stop reason: HOSPADM

## 2021-11-12 RX ORDER — GLYCOPYRROLATE 0.2 MG/ML
INJECTION INTRAMUSCULAR; INTRAVENOUS AS NEEDED
Status: DISCONTINUED | OUTPATIENT
Start: 2021-11-12 | End: 2021-11-12 | Stop reason: SURG

## 2021-11-12 RX ORDER — LIDOCAINE HYDROCHLORIDE 20 MG/ML
INJECTION, SOLUTION INFILTRATION; PERINEURAL AS NEEDED
Status: DISCONTINUED | OUTPATIENT
Start: 2021-11-12 | End: 2021-11-12 | Stop reason: SURG

## 2021-11-12 RX ORDER — PROPOFOL 10 MG/ML
VIAL (ML) INTRAVENOUS AS NEEDED
Status: DISCONTINUED | OUTPATIENT
Start: 2021-11-12 | End: 2021-11-12 | Stop reason: SURG

## 2021-11-12 RX ORDER — PROPOFOL 10 MG/ML
VIAL (ML) INTRAVENOUS CONTINUOUS PRN
Status: DISCONTINUED | OUTPATIENT
Start: 2021-11-12 | End: 2021-11-12 | Stop reason: SURG

## 2021-11-12 RX ADMIN — GLYCOPYRROLATE 0.2 MG: 0.2 INJECTION INTRAMUSCULAR; INTRAVENOUS at 11:19

## 2021-11-12 RX ADMIN — Medication 100 MG: at 11:28

## 2021-11-12 RX ADMIN — SODIUM CHLORIDE, POTASSIUM CHLORIDE, SODIUM LACTATE AND CALCIUM CHLORIDE 1000 ML: 600; 310; 30; 20 INJECTION, SOLUTION INTRAVENOUS at 10:43

## 2021-11-12 RX ADMIN — PROPOFOL 180 MCG/KG/MIN: 10 INJECTION, EMULSION INTRAVENOUS at 11:26

## 2021-11-12 RX ADMIN — LIDOCAINE HYDROCHLORIDE 60 MG: 20 INJECTION, SOLUTION INFILTRATION; PERINEURAL at 11:21

## 2021-11-12 NOTE — H&P
[facility]  Pre Procedure History & Physical    Chief Complaint:   Nausea with vomiting, GERD    Subjective     HPI:   46-year-old female presents the endoscopy suite for upper endoscopic evaluation. She has had issues with nausea and vomiting as well as reflux.    Past Medical History:   Past Medical History:   Diagnosis Date   • Degenerative disorder of bone     back   • Diverticulitis    • GERD (gastroesophageal reflux disease)    • Hiatal hernia    • History of 2019 novel coronavirus disease (COVID-19) 11/2020   • Hypertension    • Peptic ulcer disease    • Spinal stenosis    • Thyroid cancer (HCC)    • Thyroid disorder    • TIA (transient ischemic attack)        Past Surgical History:  Past Surgical History:   Procedure Laterality Date   • COLONOSCOPY     • COLONOSCOPY N/A 11/22/2019    Procedure: COLONOSCOPY INTO CECUM & TERMINAL ILEUM WITH HOT SNARE POLYPECTOMIES;  Surgeon: Damon Barnes MD;  Location:  [facility];  Service: Gastroenterology   • CYST REMOVAL      bronchial   • ENDOSCOPY N/A 11/22/2019    Procedure: ESOPHAGOGASTRODUODENOSCOPY WITH COLD BIOPSIES;  Surgeon: Damon Barnes MD;  Location:  [facility];  Service: Gastroenterology   • THYROID BIOPSY     • THYROIDECTOMY  2016   • UPPER GASTROINTESTINAL ENDOSCOPY  01/2016    z-line irreg, [place] Grade A reflux, bx; HH, esophageal motility disorder       Family History:  Family History   Problem Relation Age of Onset   • Cancer Other    • Diabetes Other    • Hypertension Other    • Heart disease Other    • Malig Hyperthermia Neg Hx        Social History:   reports that she has never smoked. She has never used smokeless tobacco. Drug use questions deferred to the physician. She reports that she does not drink alcohol.    Medications:   No medications prior to admission.       Allergies:  Penicillins    ROS:    Pertinent items are noted in HPI, all other systems reviewed and negative     Objective     There were  no vitals taken for this visit.    Physical Exam   Constitutional: Pt is oriented to person, place, and time and well-developed, well-nourished, and in no distress.   Mouth/Throat: Oropharynx is clear and moist.   Neck: Normal range of motion.   Cardiovascular: Normal rate, regular rhythm and normal heart sounds.    Pulmonary/Chest: Effort normal and breath sounds normal.   Abdominal: Soft. Nontender  Skin: Skin is warm and dry.   Psychiatric: Mood, memory, affect and judgment normal.     Assessment/Plan     Diagnosis:  Nausea with vomiting  GERD    Anticipated Surgical Procedure:  EGD    The risks, benefits, and alternatives of this procedure have been discussed with the patient or the responsible party- the patient understands and agrees to proceed.

## 2021-11-12 NOTE — ANESTHESIA PREPROCEDURE EVALUATION
Anesthesia Evaluation     Patient summary reviewed and Nursing notes reviewed   no history of anesthetic complications:  NPO Solid Status: > 6 hours  NPO Liquid Status: > 6 hours           Airway   Mallampati: II  TM distance: >3 FB  Neck ROM: full  no difficulty expected and No difficulty expected  Dental - normal exam     Pulmonary - negative pulmonary ROS and normal exam    breath sounds clear to auscultation  (-) rhonchi, decreased breath sounds, wheezes, rales, stridor  Cardiovascular - normal exam    NYHA Classification: I  Rhythm: regular  Rate: normal    (+) hypertension,   (-) murmur, weak pulses, friction rub, systolic click, carotid bruits, JVD, peripheral edema      Neuro/Psych  (+) TIA,     GI/Hepatic/Renal/Endo    (+)  hiatal hernia, GERD, PUD,  thyroid problem hypothyroidism    Musculoskeletal     (+) back pain,   Abdominal  - normal exam    Abdomen: soft.   Substance History - negative use     OB/GYN negative ob/gyn ROS         Other      history of cancer remission                    Anesthesia Plan    ASA 2     MAC   (I have reviewed the patient's history with the patient and the chart, including all pertinent laboratory results and imaging.  Intravenous Sedation protocol, risks and possible complications explained.  All questions answered.  Patient agrees to anesthetic plan.  )  intravenous induction     Anesthetic plan, all risks, benefits, and alternatives have been provided, discussed and informed consent has been obtained with: patient.

## 2021-11-13 LAB — UREASE TISS QL: NEGATIVE

## 2021-11-15 LAB
LAB AP CASE REPORT: NORMAL
PATH REPORT.FINAL DX SPEC: NORMAL
PATH REPORT.GROSS SPEC: NORMAL

## 2021-12-07 ENCOUNTER — TELEPHONE (OUTPATIENT)
Dept: GASTROENTEROLOGY | Facility: CLINIC | Age: 46
End: 2021-12-07

## 2021-12-07 NOTE — TELEPHONE ENCOUNTER
----- Message from Tashi CABELLO MD sent at 12/5/2021  3:20 PM EST -----  Regarding: Biopsy results  Okay to call results, recommend continue PPI.  If nausea and vomiting persist would consider gastric emptying study as well as HIDA scan.  ----- Message -----  From: Lab, Background User  Sent: 11/13/2021   1:18 PM EST  To: Tashi CABELLO MD

## 2022-05-09 ENCOUNTER — TELEPHONE (OUTPATIENT)
Dept: GASTROENTEROLOGY | Facility: CLINIC | Age: 47
End: 2022-05-09

## 2022-05-09 RX ORDER — PANTOPRAZOLE SODIUM 40 MG/1
40 TABLET, DELAYED RELEASE ORAL 2 TIMES DAILY
Qty: 180 TABLET | Refills: 3 | Status: SHIPPED | OUTPATIENT
Start: 2022-05-09 | End: 2023-02-09 | Stop reason: SDUPTHER

## 2022-05-09 NOTE — TELEPHONE ENCOUNTER
----- Message from Lucila Amaya sent at 5/9/2022 12:07 PM EDT -----  Regarding: Refill  Can you please send a refill to [facility] in [facility]  today my insurance will end at the end of the week and I wont have insurance again for 90 days..   Any question call me [phone]    Thanks   Lucila

## 2022-09-23 ENCOUNTER — APPOINTMENT (OUTPATIENT)
Dept: WOMENS IMAGING | Facility: HOSPITAL | Age: 47
End: 2022-09-23

## 2022-09-23 PROCEDURE — 77063 BREAST TOMOSYNTHESIS BI: CPT | Performed by: RADIOLOGY

## 2022-09-23 PROCEDURE — 77067 SCR MAMMO BI INCL CAD: CPT | Performed by: RADIOLOGY

## 2023-02-09 ENCOUNTER — OFFICE VISIT (OUTPATIENT)
Dept: GASTROENTEROLOGY | Facility: CLINIC | Age: 48
End: 2023-02-09
Payer: COMMERCIAL

## 2023-02-09 VITALS
HEART RATE: 73 BPM | BODY MASS INDEX: 50.02 KG/M2 | DIASTOLIC BLOOD PRESSURE: 81 MMHG | HEIGHT: 64 IN | WEIGHT: 293 LBS | OXYGEN SATURATION: 97 % | SYSTOLIC BLOOD PRESSURE: 146 MMHG | TEMPERATURE: 97.2 F

## 2023-02-09 DIAGNOSIS — K44.9 HIATAL HERNIA: Primary | ICD-10-CM

## 2023-02-09 DIAGNOSIS — K21.00 GASTROESOPHAGEAL REFLUX DISEASE WITH ESOPHAGITIS WITHOUT HEMORRHAGE: ICD-10-CM

## 2023-02-09 PROCEDURE — 99212 OFFICE O/P EST SF 10 MIN: CPT | Performed by: INTERNAL MEDICINE

## 2023-02-09 RX ORDER — PANTOPRAZOLE SODIUM 40 MG/1
40 TABLET, DELAYED RELEASE ORAL 2 TIMES DAILY
Qty: 180 TABLET | Refills: 3 | Status: SHIPPED | OUTPATIENT
Start: 2023-02-09

## 2023-02-09 RX ORDER — METOPROLOL SUCCINATE 50 MG/1
50 TABLET, EXTENDED RELEASE ORAL NIGHTLY
COMMUNITY

## 2023-02-09 RX ORDER — LEVOTHYROXINE SODIUM 137 UG/1
137 TABLET ORAL DAILY
COMMUNITY

## 2023-02-09 NOTE — PROGRESS NOTES
Chief Complaint   Patient presents with   • Med Refill       Lucila Amaya is a  47 y.o. female here for a follow up visit for med refill.    HPI     Patient 47-year-old female with history of hypertension, diverticulosis and a history of thyroid cancer here for follow-up for her GERD.  Patient is due for med refill reports excellent control on current therapy.  Denies any dysphagia no further nausea or vomiting.    Past Medical History:   Diagnosis Date   • Degenerative disorder of bone     back   • Diverticulitis    • GERD (gastroesophageal reflux disease)    • Hiatal hernia    • History of 2019 novel coronavirus disease (COVID-19) 11/2020   • Hypertension    • Peptic ulcer disease    • Spinal stenosis    • Thyroid cancer (HCC)    • Thyroid disorder    • TIA (transient ischemic attack)          Current Outpatient Medications:   •  calcitriol (ROCALTROL) 0.5 MCG capsule, Take 0.25 mcg by mouth Daily., Disp: , Rfl:   •  cholecalciferol (VITAMIN D3) 250 MCG (66134 UT) capsule, Take 10,000 Units by mouth 2 (Two) Times a Week., Disp: , Rfl:   •  gabapentin (NEURONTIN) 100 MG capsule, Take 100 mg by mouth 3 (Three) Times a Day., Disp: , Rfl:   •  levothyroxine (SYNTHROID, LEVOTHROID) 137 MCG tablet, Take 137 mcg by mouth Daily., Disp: , Rfl:   •  liothyronine (CYTOMEL) 5 MCG tablet, Take 5 mcg by mouth Daily., Disp: , Rfl:   •  metoprolol succinate XL (TOPROL-XL) 50 MG 24 hr tablet, Take 50 mg by mouth Every Night., Disp: , Rfl:   •  pantoprazole (PROTONIX) 40 MG EC tablet, Take 1 tablet by mouth 2 (Two) Times a Day., Disp: 180 tablet, Rfl: 3  •  progesterone (PROMETRIUM) 200 MG capsule, Take 200 mg by mouth Daily., Disp: , Rfl:   •  traMADol (ULTRAM) 50 MG tablet, TK 1 T PO BID FOR 30 DAYS, Disp: , Rfl: 1  •  levothyroxine (SYNTHROID, LEVOTHROID) 150 MCG tablet, Take 125 mcg by mouth Daily., Disp: , Rfl:   •  Triamterene-HCTZ (MAXZIDE PO), Take 37.5 mg by mouth Daily. 75 mg daily, Disp: , Rfl:     Allergies    Allergen Reactions   • Penicillins Anaphylaxis     Hives  CONVULSIONS       Social History     Socioeconomic History   • Marital status:    Tobacco Use   • Smoking status: Never   • Smokeless tobacco: Never   Substance and Sexual Activity   • Alcohol use: No   • Drug use: Defer   • Sexual activity: Defer       Family History   Problem Relation Age of Onset   • Cancer Other    • Diabetes Other    • Hypertension Other    • Heart disease Other    • Malig Hyperthermia Neg Hx        Review of Systems   Constitutional: Negative.    Respiratory: Negative.    Cardiovascular: Negative.    Gastrointestinal: Negative.    Musculoskeletal: Negative.    Skin: Negative.    Hematological: Negative.        Vitals:    02/09/23 0909   BP: 146/81   Pulse: 73   Temp: 97.2 °F (36.2 °C)   SpO2: 97%       Physical Exam  Vitals reviewed.   Constitutional:       Appearance: Normal appearance. She is well-developed.   HENT:      Head: Normocephalic and atraumatic.   Eyes:      General: No scleral icterus.     Pupils: Pupils are equal, round, and reactive to light.   Pulmonary:      Effort: Pulmonary effort is normal.      Breath sounds: Normal breath sounds.   Abdominal:      General: Bowel sounds are normal. There is no distension.      Palpations: Abdomen is soft. There is no mass.      Tenderness: There is no abdominal tenderness.      Hernia: No hernia is present.   Skin:     General: Skin is warm and dry.      Coloration: Skin is not jaundiced.      Findings: No rash.   Neurological:      General: No focal deficit present.      Mental Status: She is alert and oriented to person, place, and time.      Cranial Nerves: No cranial nerve deficit.   Psychiatric:         Behavior: Behavior normal.         Thought Content: Thought content normal.         Judgment: Judgment normal.         No visits with results within 2 Month(s) from this visit.   Latest known visit with results is:   Admission on 11/12/2021, Discharged on 11/12/2021    Component Date Value Ref Range Status   • Case Report 11/12/2021    Final                    Value:Surgical Pathology Report                         Case: [id number]                                  Authorizing Provider:  Tashi Stevens MD   Collected:           11/12/2021 11:33 AM          Ordering Location:     [facility]  Received:            11/12/2021 12:17 PM                                 ENDO SUITES                                                                  Pathologist:           Dolly Henley MD                                                          Specimens:   1) - Small Intestine, Duodenum, duodenum bx                                                         2) - Gastric, Antrum, antrum bx                                                                     3) - GE Junction, ge junction bx                                                          • Final Diagnosis 11/12/2021    Final                    Value:This result contains rich text formatting which cannot be displayed here.   • Gross Description 11/12/2021    Final                    Value:This result contains rich text formatting which cannot be displayed here.   • Urease 11/12/2021 Negative  Negative Final       Diagnoses and all orders for this visit:    1. Hiatal hernia (Primary)    2. Gastroesophageal reflux disease with esophagitis without hemorrhage    Other orders  -     pantoprazole (PROTONIX) 40 MG EC tablet; Take 1 tablet by mouth 2 (Two) Times a Day.  Dispense: 180 tablet; Refill: 3      Patient 47-year-old female with history of thyroid cancer, hypertension, diverticulosis and GERD here for med refill.  Patient doing very well on pantoprazole.  Patient reports no further nausea and vomiting will refill her medications and follow-up in 1 year or as needed.

## 2023-03-15 DIAGNOSIS — M25.552 BILATERAL HIP PAIN: Primary | ICD-10-CM

## 2023-03-15 DIAGNOSIS — M25.551 BILATERAL HIP PAIN: Primary | ICD-10-CM

## 2023-03-22 ENCOUNTER — HOSPITAL ENCOUNTER (OUTPATIENT)
Dept: GENERAL RADIOLOGY | Facility: HOSPITAL | Age: 48
Discharge: HOME | End: 2023-03-22
Admitting: PHYSICIAN ASSISTANT
Payer: COMMERCIAL

## 2023-03-22 ENCOUNTER — OFFICE VISIT (OUTPATIENT)
Dept: ORTHOPEDIC SURGERY | Facility: CLINIC | Age: 48
End: 2023-03-22
Payer: COMMERCIAL

## 2023-03-22 VITALS — BODY MASS INDEX: 49.34 KG/M2 | TEMPERATURE: 98.6 F | WEIGHT: 289 LBS | HEIGHT: 64 IN

## 2023-03-22 DIAGNOSIS — M25.552 BILATERAL HIP PAIN: ICD-10-CM

## 2023-03-22 DIAGNOSIS — M25.551 BILATERAL HIP PAIN: ICD-10-CM

## 2023-03-22 DIAGNOSIS — M62.838 SPASM OF MUSCLE: Primary | ICD-10-CM

## 2023-03-22 PROCEDURE — 99214 OFFICE O/P EST MOD 30 MIN: CPT | Performed by: PHYSICIAN ASSISTANT

## 2023-03-22 PROCEDURE — 73502 X-RAY EXAM HIP UNI 2-3 VIEWS: CPT

## 2023-03-22 RX ORDER — BACLOFEN 10 MG/1
10 TABLET ORAL 3 TIMES DAILY
Qty: 180 TABLET | Refills: 1 | Status: SHIPPED | OUTPATIENT
Start: 2023-03-22

## 2023-03-22 NOTE — PROGRESS NOTES
"Chief Complaint  Pain and Establish Care of the Right Hip and Pain and Establish Care of the Left Hip    Subjective    History of Present Illness      Lucila Amaya is a 48 y.o. female who presents to [facility] for new complaint of  Hip Pain: Patient complains of bilateral hip pain.     The patient states she is not really experiencing hip pain \"per se\" and she was informed that the pain maybe secondary to the leg spasms she is having at nighttime. She notes if she gets on the ground, she is unable to get up. She reports she is experiencing symptoms during the day while she is driving. She reports she had pain of the sciatic nerve on her left side and she recently had a double nerve ablation on 02/24/2023, which has done nothing. She adds it did not stop the leg spasms. She states she has been to her primary care provider, her pain management provider, and was advised to come see me for an opinion. She reports, at first, the issues were not bothering her at work or drive; however, now it is. She adds her legs have begun to go completely numb if she sits for more than 1 hour and she has to get up and move around. She notes she feels as if she is sitting on something that is impinging the nerve. She communicates the numbness is from her toes to her \"backside\". She adds she has to have help when she gets up. The numbness began around mid February. She denies any injury. She denies groin pain. She uses Baclofen which helps to lessen the spasms in her legs.     The patient reports she has had labs drawn by her primary care provider and her endocrinologist. She notes her calcium was slightly elevated; however, her potassium and magnesium are good. She notes her thyroid level is off slightly. She reports her medications were adjusted recently.    The patient states she was informed by pain management that she has degenerative bone disease and the only thing to help with that is physical " "therapy. She adds she was informed by pain management that the spasms had nothing to do with her back; however, everybody else says it does. She  was informed to return after double nerve ablation if she did not experience any relief. She notes it has only been 2 weeks and it can take up to 1 month to obtain relief. She adds she has had the ablation done before; however, not both sides at the same time. She notes her left side is worse than her right. She reports the pain, to her left side, radiates up from her kneecap to her buttocks and is constant. She denies experiencing those symptoms to the right side, just numbness.     The patient denies any bowel or bladder changes.     The patient reports she had an MRI with contrast approximately 3 years ago and also a scan before that.      Objective   Vital Signs:   Temp 98.6 °F (37 °C)   Ht 162.6 cm (64\")   Wt 131 kg (289 lb)   BMI 49.61 kg/m²       Physical Exam  Vitals and nursing note reviewed.   Constitutional:       Appearance: Normal appearance.   Pulmonary:      Effort: Pulmonary effort is normal.   Skin:     General: Skin is warm and dry.      Capillary Refill: Capillary refill takes less than 2 seconds.   Neurological:      General: No focal deficit present.      Mental Status: She is alert and oriented to person, place, and time. Mental status is at baseline.   Psychiatric:         Mood and Affect: Mood normal.         Behavior: Behavior normal.         Thought Content: Thought content normal.         Judgment: Judgment normal.         Ortho Exam   BILATERAL hip  There is no tenderness with palpation over the greater trochanteric bursa.   There is no tenderness with palpation of the IT band.  Tenderness of the gluteal bursa.  Cross body adduction is positive.   There is no clinical deformity and no shortening of extremity.   She does have a limp upon walking.    Dorsalis pedis and posterior tibial artery pulses are palpable.   Neurovascular status is " intact and capillary refill is less than 2 seconds.   Common peroneal nerve function is well preserved.      Result Review :   Radiologic studies - see below for interpretation  BILATERAL hip with pelvis xrays   3 views were ordered by Inderjit Brown PA-C. Performed at [facility] on 3/22/2023. Images were independently viewed and interpreted by myself, my impression as follows:   Findings: Normal appearing right and left hip joint space, no acute bony abnormality is noted and no arthritic findings present  Bony lesion: no  Soft tissues: within normal limits  Joint spaces: within normal limits  Hardware appropriately positioned: not applicable  Prior studies available for comparison: no            PROCEDURE  Procedures           Assessment   Assessment and Plan    Problem List Items Addressed This Visit        Musculoskeletal and Injuries    Spasm of muscle - Primary    Relevant Medications    baclofen (LIORESAL) 10 MG tablet    Bilateral hip pain       Follow Up   · Discussion of any imaging in detail. Discussion of orthopaedic goals.  · Risk, benefits, and merits of treatment alternatives reviewed with the patient. Discussed with her the imaging of the hips that is normal. Discussed that my concern is that her symptoms are coming from the back or could possibly be related to an underlying neurological issue. I advised to follow back up with pain management and ask what they think about her seeing neurosurgery and/or neurology for evaluation.  · Ice, heat, and/or modalities as beneficial  · Patient is encouraged to call or return for any issues or concerns.  · Follow up as needed  • Patient was given instructions and counseling regarding her condition or for health maintenance advice. Please see specific information pulled into the AVS if appropriate.     Inderjit Brown PA-C   Date of Encounter: 3/22/2023   Electronically signed by Inderjit Brown PA-C, 03/22/23, 9:19 AM EDT.      Transcribed from ambient dictation for Inderjit Brown PA-C by Char Reyna.  03/22/23   13:39 EDT    Patient or patient representative verbalized consent to the visit recording.  I have personally performed the services described in this document as transcribed by the above individual, and it is both accurate and complete.  Inderjit Brown PA-C  3/31/2023  20:09 EDT

## 2023-06-02 DIAGNOSIS — M25.562 LEFT KNEE PAIN, UNSPECIFIED CHRONICITY: Primary | ICD-10-CM

## 2023-06-05 ENCOUNTER — OFFICE VISIT (OUTPATIENT)
Dept: ORTHOPEDIC SURGERY | Facility: CLINIC | Age: 48
End: 2023-06-05
Payer: COMMERCIAL

## 2023-06-05 ENCOUNTER — HOSPITAL ENCOUNTER (OUTPATIENT)
Dept: GENERAL RADIOLOGY | Facility: HOSPITAL | Age: 48
Discharge: HOME | End: 2023-06-05
Admitting: PHYSICIAN ASSISTANT
Payer: COMMERCIAL

## 2023-06-05 VITALS — TEMPERATURE: 98.1 F | BODY MASS INDEX: 49.85 KG/M2 | HEIGHT: 64 IN | WEIGHT: 292 LBS

## 2023-06-05 DIAGNOSIS — M25.562 ACUTE PAIN OF LEFT KNEE: ICD-10-CM
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
## 2023-06-05 DIAGNOSIS — M25.562 LEFT KNEE PAIN, UNSPECIFIED CHRONICITY: ICD-10-CM

## 2023-06-05 PROCEDURE — 73560 X-RAY EXAM OF KNEE 1 OR 2: CPT

## 2023-06-05 NOTE — PROGRESS NOTES
"Chief Complaint  Initial Evaluation of the Left Knee    Subjective    History of Present Illness      Lucila Amaya is a 48 y.o. female who presents to [facility] for new complaint of  Knee Pain:   Patient complains of left knee pain.   The pain began 4 weeks ago.   The pain is located over superior and inferior aspects.    She describes the symptoms as burning, throbbing, and ripping .   Symptoms improve with ice, medication: Aleve, Tylenol, Tylenol #3 used and beneficial, rest sitting.   The symptoms are worse with stair climbing, palpation, Walking.      The patient states she fell down 3 small stairs and \"one leg went this way and one leg went that way.\" She states she was \"completely black\" from the middle of her back to the posterior aspect of her lower extremity for approximately 2 days.     Currently, the patient states her pain has not improved. She states her pain is not severe during the day when sitting, but walking produces a \"ripping\" and burning sensation similar to being on \"fire.\" Resting her knees together at night is painful. She notes the pain disrupts her sleep, and she is only able to obtain approximately 1.5 hours of sleep at night currently. The patient states she has the knee brace she was given previously, and she believes it may help if she were to wear it regularly. She adds that elevating her knee while sitting helps with the pain. She reports she is unable to take non-steroidal anti-inflammatory drugs secondary to renal issues.       Objective   Vital Signs:   Temp 98.1 °F (36.7 °C)   Ht 162.6 cm (64\")   Wt 132 kg (292 lb)   BMI 50.12 kg/m²       Physical Exam  Vitals signs and nursing note reviewed.   Constitutional:       Appearance: Normal appearance.   Pulmonary:      Effort: Pulmonary effort is normal.   Skin:     General: Skin is warm and dry.      Capillary Refill: Capillary refill takes less than 2 seconds.   Neurological:      General: No " focal deficit present.      Mental Status: She is alert and oriented to person, place, and time. Mental status is at baseline.   Psychiatric:         Mood and Affect: Mood normal.         Behavior: Behavior normal.         Thought Content: Thought content normal.         Judgment: Judgment normal.     Ortho Exam   LEFT knee  Positive for pain with palpation at the medial knee and surrounding soft tissues superior and inferior to the medial knee.  There is a valgus orientation of the knee.  Positive grind test.      Dorsalis pedis and posterior tibial artery pulses are palpable.   Common peroneal nerve function is well preserved.   Gait is cautious and antalgic.  Positive for fullness, and tenderness with palpation, in the popliteal fossa.   Range of motion of extension-flexion: 0-100 degrees.        Result Review :   Radiologic studies - see below for interpretation  LEFT knee xrays  weightbearing/standing 2 views were ordered by Inderjit Brown PA-C. Performed at [facility] on 6/5/2023. Images were independently viewed and interpreted by myself, my impression as follows:  Findings: Moderate tricompartmental osteoarthritis with joint space narrowing present at the patellofemoral and lateral compartment. Small calcified area at the medial femoral condyle.  Bony lesion: no  Soft tissues: within normal limits  Joint spaces: decreased  Hardware appropriately positioned: not applicable  Prior studies available for comparison: no         PROCEDURE  Procedures           Assessment   Assessment and Plan    Diagnoses and all orders for this visit:    1. Fall from slip, trip, or stumble, initial encounter (Primary)  -     MRI Knee Left Without Contrast; Future    2. Acute pain of left knee  -     MRI Knee Left Without Contrast; Future         Follow Up   Discussion of any imaging in detail. Discussion of orthopaedic goals.  Risk, benefits, and merits of treatment alternatives reviewed with the  patient. Treatment alternatives include: intra-articular steroid injection, bracing, and further imaging/testing  Ice, heat, and/or modalities as beneficial  To schedule MRI of left knee to evaluate for fracture vs soft tissue injury  Patient is encouraged to call or return for any issues or concerns.  No brace was given at today's visit. Continue to use brace she has at home.  Follow up will be based on when MRI has been performed  Patient was given instructions and counseling regarding her condition or for health maintenance advice. Please see specific information pulled into the AVS if appropriate.     Inderjit Brown PA-C   Date of Encounter: 6/5/2023     Transcribed from ambient dictation for Inderjit Brown PA-C by Melissa Bhatti.  06/05/23   10:31 EDT    Patient or patient representative verbalized consent to the visit recording.  I have personally performed the services described in this document as transcribed by the above individual, and it is both accurate and complete.  Inderjit Brown PA-C  6/5/2023  11:07 EDT

## 2023-06-09 ENCOUNTER — TELEPHONE (OUTPATIENT)
Dept: ORTHOPEDIC SURGERY | Facility: CLINIC | Age: 48
End: 2023-06-09
Payer: COMMERCIAL
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
## 2023-06-09 DIAGNOSIS — M25.562 ACUTE PAIN OF LEFT KNEE: ICD-10-CM

## 2023-06-09 NOTE — TELEPHONE ENCOUNTER
PATIENT HAS HER MRI ON JUNE 24TH BUT IS STILL IN A LOT OF PAIN.    CAN YOU CALL SOMETHING INTO [facility] IN [place]?

## 2023-06-12 RX ORDER — TRAMADOL HYDROCHLORIDE 50 MG/1
TABLET ORAL
Qty: 40 TABLET | Refills: 0 | Status: SHIPPED | OUTPATIENT
Start: 2023-06-12

## 2024-02-06 ENCOUNTER — TELEPHONE (OUTPATIENT)
Dept: GASTROENTEROLOGY | Facility: CLINIC | Age: 49
End: 2024-02-06
Payer: COMMERCIAL

## 2024-03-05 ENCOUNTER — OFFICE VISIT (OUTPATIENT)
Dept: GASTROENTEROLOGY | Facility: CLINIC | Age: 49
End: 2024-03-05
Payer: COMMERCIAL

## 2024-03-05 ENCOUNTER — TELEPHONE (OUTPATIENT)
Dept: GASTROENTEROLOGY | Facility: CLINIC | Age: 49
End: 2024-03-05
Payer: COMMERCIAL

## 2024-03-05 VITALS
DIASTOLIC BLOOD PRESSURE: 77 MMHG | SYSTOLIC BLOOD PRESSURE: 145 MMHG | HEIGHT: 64 IN | HEART RATE: 66 BPM | TEMPERATURE: 97.3 F | BODY MASS INDEX: 50.02 KG/M2 | OXYGEN SATURATION: 95 % | WEIGHT: 293 LBS

## 2024-03-05 DIAGNOSIS — K21.00 GASTROESOPHAGEAL REFLUX DISEASE WITH ESOPHAGITIS WITHOUT HEMORRHAGE: Primary | ICD-10-CM

## 2024-03-05 DIAGNOSIS — Z12.11 SCREENING FOR COLON CANCER: ICD-10-CM

## 2024-03-05 RX ORDER — CYCLOBENZAPRINE HCL 10 MG
1 TABLET ORAL 3 TIMES DAILY
COMMUNITY
Start: 2023-11-16

## 2024-03-05 RX ORDER — HYDROCHLOROTHIAZIDE 25 MG/1
25 TABLET ORAL DAILY
COMMUNITY
Start: 2024-01-11

## 2024-03-05 RX ORDER — PANTOPRAZOLE SODIUM 40 MG/1
40 TABLET, DELAYED RELEASE ORAL 2 TIMES DAILY
Qty: 180 TABLET | Refills: 3 | Status: SHIPPED | OUTPATIENT
Start: 2024-03-05

## 2024-03-05 RX ORDER — TIRZEPATIDE 2.5 MG/.5ML
2.5 INJECTION, SOLUTION SUBCUTANEOUS
COMMUNITY
Start: 2024-02-22

## 2024-03-05 NOTE — PROGRESS NOTES
Chief Complaint   Patient presents with    Heartburn       HPI    Lucila Amaya is a  48 y.o. female here for a follow up visit for GERD.     This patient follows with Dr. Barnes and myself.    She has been doing well on twice daily pantoprazole to manage GERD symptoms.  Denies dysphagia, odynophagia, nausea or vomiting.  Denies poor appetite or weight loss.  BMI 51.39.     Denies diarrhea, constipation or rectal bleeding.    Additional data reviewed:    EGD 2021 - Tortuous esophagus, gastritis, normal duodenum.    C-scope 2019 - Normal ileum, polyps, diverticulosis, nonbleeding internal hemorrhoids.  Recall 5 years.    Past Medical History:   Diagnosis Date    Arthritis of back 2001    Cervical disc disorder 2005    Degenerative disorder of bone     back    Diverticulitis     Fracture, finger 1990    GERD (gastroesophageal reflux disease)     Hiatal hernia     Hip arthrosis 2001    History of 2019 novel coronavirus disease (COVID-19) 11/2020    Hypertension     Knee swelling 2022    Low back strain 2002    Lumbosacral disc disease 2005    Peptic ulcer disease     Spinal stenosis     Thyroid cancer     Thyroid disorder     TIA (transient ischemic attack)        Past Surgical History:   Procedure Laterality Date    COLONOSCOPY      COLONOSCOPY N/A 11/22/2019    Procedure: COLONOSCOPY INTO CECUM & TERMINAL ILEUM WITH HOT SNARE POLYPECTOMIES;  Surgeon: Damon Barnes MD;  Location: [facility];  Service: Gastroenterology    CYST REMOVAL      bronchial    ENDOSCOPY N/A 11/22/2019    Procedure: ESOPHAGOGASTRODUODENOSCOPY WITH COLD BIOPSIES;  Surgeon: Damon Barnes MD;  Location: [facility];  Service: Gastroenterology    ENDOSCOPY N/A 11/12/2021    Procedure: ESOPHAGOGASTRODUODENOSCOPY;  Surgeon: Tashi Stevens MD;  Location: [facility] ENDOSCOPY;  Service: Gastroenterology;  Laterality: N/A;  PRE - Nausea with vomiting, GERD  POST - gastritis, presbiesophagus    THYROID BIOPSY       THYROIDECTOMY  2016    TRIGGER POINT INJECTION  2023    Been getting injections for 6 years    UPPER GASTROINTESTINAL ENDOSCOPY  01/2016    z-line irreg, [place] Grade A reflux, bx; HH, esophageal motility disorder       Scheduled Meds:     Continuous Infusions: No current facility-administered medications for this visit.      PRN Meds:     Allergies   Allergen Reactions    Penicillins Anaphylaxis     Hives  CONVULSIONS    Codeine Hives and Rash     Other reaction(s): hives       Social History     Socioeconomic History    Marital status:    Tobacco Use    Smoking status: Never    Smokeless tobacco: Never   Substance and Sexual Activity    Alcohol use: No    Drug use: Never    Sexual activity: Not Currently     Partners: Male     Birth control/protection: Abstinence     Comment: Perimenopause       Family History   Problem Relation Age of Onset    Cancer Other     Diabetes Other     Hypertension Other     Heart disease Other     Osteoporosis Mother     Broken bones Father         Both Arms    Cancer Father         bladder cancer    Cancer Paternal Grandfather         Bone Cancer    Broken bones Brother         Fingers    Malig Hyperthermia Neg Hx        Review of Systems   HENT:  Negative for trouble swallowing.    Gastrointestinal: Negative.        Vitals:    03/05/24 1001   BP: 145/77   Pulse: 66   Temp: 97.3 °F (36.3 °C)   SpO2: 95%       Physical Exam  Constitutional:       Appearance: She is well-developed.   Abdominal:      General: Bowel sounds are normal. There is no distension.      Palpations: Abdomen is soft. There is no mass.      Tenderness: There is no abdominal tenderness. There is no guarding.      Hernia: No hernia is present.   Skin:     General: Skin is warm and dry.      Capillary Refill: Capillary refill takes less than 2 seconds.   Neurological:      Mental Status: She is alert and oriented to person, place, and time.   Psychiatric:         Behavior: Behavior normal.        Assessment    Diagnoses and all orders for this visit:    1. Gastroesophageal reflux disease with esophagitis without hemorrhage (Primary)    2. Screening for colon cancer    Other orders  -     pantoprazole (PROTONIX) 40 MG EC tablet; Take 1 tablet by mouth 2 (Two) Times a Day.  Dispense: 180 tablet; Refill: 3    Plan    Stable GERD, continue twice daily PPI therapy in combination with antireflux dietary precautions  Schedule screening colonoscopy with Dr. Barnes  RTC 1 year or sooner if needed         OCTAVIA Todd  [facility]  [address] [place] [address]  Office: [phone]

## 2024-03-05 NOTE — TELEPHONE ENCOUNTER
ROWAN TORRES IN SCHEDULING PT SCHEDULED 07/24/2024 ARRIVING AT 0830AM MIRALAX PREP INSTRUCTIONS HANDED TO PT.OK FOR HUB TO READ

## 2024-07-23 ENCOUNTER — TELEPHONE (OUTPATIENT)
Dept: GASTROENTEROLOGY | Facility: CLINIC | Age: 49
End: 2024-07-23
Payer: COMMERCIAL

## 2024-07-23 NOTE — TELEPHONE ENCOUNTER
ROWAN TORRES IN SCHEDULING PT CANCELLED,ROWAN PT AGREEABLE WITH MOVING FORWARD WITH ANOTHER [facility] PROVIDER WHEN SHE IS FEELING BETTER HAD TO CANCEL DUE TO COVID,DR DOYLE LEAVING PRACTICE COMMUNICATED ANOTHER PROVIDER OPTION PT AGREEABLE.OK FOR HUB TO RELAY

## 2024-07-23 NOTE — TELEPHONE ENCOUNTER
Caller: Lucila Amaya    Relationship to patient: Self    Best call back number: [phone]     Chief complaint: POSITIVE COVID TEST    Type of visit: PROCEDURE    Requested date: NEXT AVAILABLE PAST QUARANTINE PERIOD     If rescheduling, when is the original appointment: 7/24/24     UNABLE TO WT, OKAY TO LEAVE VM

## 2024-07-23 NOTE — TELEPHONE ENCOUNTER
ROWAN TORRES IN SCHEDULING PT CANCELLED,ROWAN PT AGREEABLE WITH MOVING FORWARD WITH ANOTHER [facility] PROVIDER WHEN SHE IS FEELING BETTER HAD TO CANCEL DUE TO COVID,DR DOYLE LEAVING PRACTICE COMMUNICATED ANOTHER PROVIDER OPTION PT AGREEABLE.OK FOR HUB TO RELAY

## 2024-07-26 DIAGNOSIS — Z12.11 ENCOUNTER FOR SCREENING FOR MALIGNANT NEOPLASM OF COLON: Primary | ICD-10-CM

## 2024-07-26 DIAGNOSIS — Z86.010 PERSONAL HISTORY OF COLONIC POLYPS: ICD-10-CM

## 2024-07-26 RX ORDER — SODIUM CHLORIDE, SODIUM LACTATE, POTASSIUM CHLORIDE, CALCIUM CHLORIDE 600; 310; 30; 20 MG/100ML; MG/100ML; MG/100ML; MG/100ML
30 INJECTION, SOLUTION INTRAVENOUS CONTINUOUS
OUTPATIENT
Start: 2024-07-26

## 2024-07-30 ENCOUNTER — TELEPHONE (OUTPATIENT)
Dept: GASTROENTEROLOGY | Facility: CLINIC | Age: 49
End: 2024-07-30
Payer: COMMERCIAL

## 2025-05-15 ENCOUNTER — TELEPHONE (OUTPATIENT)
Dept: GASTROENTEROLOGY | Facility: CLINIC | Age: 50
End: 2025-05-15
Payer: COMMERCIAL

## 2025-05-15 RX ORDER — PANTOPRAZOLE SODIUM 40 MG/1
40 TABLET, DELAYED RELEASE ORAL 2 TIMES DAILY
Qty: 180 TABLET | Refills: 3 | Status: SHIPPED | OUTPATIENT
Start: 2025-05-15

## 2025-05-15 RX ORDER — PANTOPRAZOLE SODIUM 40 MG/1
40 TABLET, DELAYED RELEASE ORAL 2 TIMES DAILY
Qty: 180 TABLET | Refills: 3 | OUTPATIENT
Start: 2025-05-15

## 2025-05-15 NOTE — TELEPHONE ENCOUNTER
Pt called needing meds refilled and adv that she needed a f/u scheduled. Appt is on 7/10 for f/u.

## 2025-07-10 ENCOUNTER — OFFICE VISIT (OUTPATIENT)
Dept: GASTROENTEROLOGY | Facility: CLINIC | Age: 50
End: 2025-07-10
Payer: COMMERCIAL

## 2025-07-10 ENCOUNTER — TELEPHONE (OUTPATIENT)
Dept: GASTROENTEROLOGY | Facility: CLINIC | Age: 50
End: 2025-07-10
Payer: COMMERCIAL

## 2025-07-10 VITALS
SYSTOLIC BLOOD PRESSURE: 143 MMHG | HEIGHT: 64 IN | HEART RATE: 81 BPM | WEIGHT: 263.4 LBS | DIASTOLIC BLOOD PRESSURE: 82 MMHG | BODY MASS INDEX: 44.97 KG/M2 | TEMPERATURE: 97.1 F

## 2025-07-10 DIAGNOSIS — K21.00 GASTROESOPHAGEAL REFLUX DISEASE WITH ESOPHAGITIS WITHOUT HEMORRHAGE: Primary | ICD-10-CM

## 2025-07-10 DIAGNOSIS — K58.0 IRRITABLE BOWEL SYNDROME WITH DIARRHEA: ICD-10-CM

## 2025-07-10 DIAGNOSIS — Z86.0100 PERSONAL HISTORY OF COLON POLYPS, UNSPECIFIED: ICD-10-CM

## 2025-07-10 DIAGNOSIS — Z12.11 SCREENING FOR COLON CANCER: ICD-10-CM

## 2025-07-10 RX ORDER — VONOPRAZAN FUMARATE 26.72 MG/1
20 TABLET ORAL DAILY
Qty: 15 TABLET | Refills: 0 | COMMUNITY
Start: 2025-07-10 | End: 2025-07-25

## 2025-07-10 RX ORDER — TOPIRAMATE 50 MG/1
TABLET, FILM COATED ORAL
COMMUNITY

## 2025-07-10 RX ORDER — TIZANIDINE 2 MG/1
TABLET ORAL
COMMUNITY
Start: 2025-05-15

## 2025-07-10 RX ORDER — RIZATRIPTAN BENZOATE 10 MG/1
10 TABLET ORAL ONCE AS NEEDED
COMMUNITY
Start: 2025-03-28

## 2025-07-10 NOTE — TELEPHONE ENCOUNTER
ROWAN Villavicencio for COLONOSCOPY on  9/18/25  arrive at 6:30  . Gave prep instructions to pt in office....miralax

## 2025-07-10 NOTE — PROGRESS NOTES
Chief Complaint   Patient presents with    Heartburn       HPI    Lucila Amaya is a  50 y.o. female here for a follow up visit for GERD.    Past medical and surgical history reviewed as below.    This patient is known to me and will also follow with Dr. Stevens.    On visit today patient reports she has been working on weight loss started a protein shake in the morning but unfortunately has seemed to flared her acid reflux.  She takes Protonix 40 mg p.o. twice daily.  She has been on this particular PPI for a number of years.  Denies nausea, vomiting, dysphagia or odynophagia.  Her appetite is good.  Weight is trending down with dietary modifications and exercise.    She is prone to diarrhea with certain dietary triggers such as watermelon and lactose.  Denies rectal bleeding or rectal pain.  She does have a personal history of colon polyps and is due for screening colonoscopy this year.  She admits she procrastinated having her colonoscopy last year due to insurance issues.    Past Medical History:   Diagnosis Date    Arthritis of back 2001    Cervical disc disorder 2005    Degenerative disorder of bone     back    Diverticulitis     Fracture, finger 1990    GERD (gastroesophageal reflux disease)     Hiatal hernia     Hip arthrosis 2001    History of 2019 novel coronavirus disease (COVID-19) 11/2020    Hypertension     Knee swelling 2022    Low back strain 2002    Lumbosacral disc disease 2005    Peptic ulcer disease     Spinal stenosis     Thyroid cancer     Thyroid disorder     TIA (transient ischemic attack)        Past Surgical History:   Procedure Laterality Date    COLONOSCOPY      COLONOSCOPY N/A 11/22/2019    Procedure: COLONOSCOPY INTO CECUM & TERMINAL ILEUM WITH HOT SNARE POLYPECTOMIES;  Surgeon: Damon Barnes MD;  Location: [facility] ENDOS[facility];  Service: Gastroenterology    CYST REMOVAL      bronchial    ENDOSCOPY N/A 11/22/2019    Procedure: ESOPHAGOGASTRODUODENOSCOPY WITH COLD BIOPSIES;   Surgeon: Damon Barnes MD;  Location: [facility] ENDOSCOPY;  Service: Gastroenterology    ENDOSCOPY N/A 11/12/2021    Procedure: ESOPHAGOGASTRODUODENOSCOPY;  Surgeon: Tashi Stevens MD;  Location: [facility] ENDOSCOPY;  Service: Gastroenterology;  Laterality: N/A;  PRE - Nausea with vomiting, GERD  POST - gastritis, presbiesophagus    THYROID BIOPSY      THYROIDECTOMY  2016    TRIGGER POINT INJECTION  2023    Been getting injections for 6 years    UPPER GASTROINTESTINAL ENDOSCOPY  01/2016    z-line irreg, [place] Grade A reflux, bx; HH, esophageal motility disorder       Scheduled Meds:     Continuous Infusions: No current facility-administered medications for this visit.      PRN Meds:     Allergies   Allergen Reactions    Penicillins Anaphylaxis     Hives  CONVULSIONS    Codeine Hives and Rash     Other reaction(s): hives       Social History     Socioeconomic History    Marital status:    Tobacco Use    Smoking status: Never    Smokeless tobacco: Never    Tobacco comments:     None   Vaping Use    Vaping status: Never Used   Substance and Sexual Activity    Alcohol use: No    Drug use: Never    Sexual activity: Not Currently     Partners: Male     Birth control/protection: Abstinence, Post-menopausal     Comment: Perimenopause       Family History   Problem Relation Age of Onset    Cancer Other     Diabetes Other     Hypertension Other     Heart disease Other     Osteoporosis Mother     Broken bones Father         Both Arms    Cancer Father         bladder cancer    Cancer Paternal Grandfather         Bone Cancer    Broken bones Brother         Fingers    Broken bones Brother         Fingers    Malig Hyperthermia Neg Hx      Vitals:    07/10/25 0845   BP: 143/82   Pulse: 81   Temp: 97.1 °F (36.2 °C)       Physical Exam  Constitutional:       Appearance: She is well-developed.   Abdominal:      General: Bowel sounds are normal. There is no distension.      Palpations: Abdomen is soft. There is no mass.       Tenderness: There is no abdominal tenderness. There is no guarding.      Hernia: No hernia is present.   Skin:     General: Skin is warm and dry.      Capillary Refill: Capillary refill takes less than 2 seconds.   Neurological:      Mental Status: She is alert and oriented to person, place, and time.   Psychiatric:         Behavior: Behavior normal.     Assessment    Diagnoses and all orders for this visit:    1. Gastroesophageal reflux disease with esophagitis without hemorrhage (Primary)    2. Irritable bowel syndrome with diarrhea    3. Screening for colon cancer    4. Personal history of colon polyps, unspecified    Other orders  -     Vonoprazan Fumarate (Voquezna) 20 MG tablet; Take 1 tablet by mouth Daily for 15 days.  Dispense: 15 tablet; Refill: 0    Plan    Regarding breakthrough dyspeptic symptoms as of late recommend she stop taking Protonix and trial Voquezna as above and monitor for symptom improvement in the coming weeks.  Samples with dosing instructions provided to the patient. Antireflux measures and dietary modifications reviewed. Low acid diet reviewed. Keep head of bed elevated. Stop eating/drinking at least 3 hours prior to bedtime. Eliminate caffeine and carbonated beverages.  Weight loss encouraged if BMI over 25.    Schedule screening colonoscopy with Dr. Stevens.  Complete prior authorization if required.    Further recommendations and follow-up pending response to change in PPI therapy and colonoscopy findings.         OCTAVIA Todd  [facility]  [address] [place] [address]  Office: [phone]

## 2025-08-12 ENCOUNTER — TELEPHONE (OUTPATIENT)
Dept: GASTROENTEROLOGY | Facility: CLINIC | Age: 50
End: 2025-08-12
Payer: COMMERCIAL

## (undated) DEVICE — SNAR POLYP SENSATION STDOVL 27 240 BX40

## (undated) DEVICE — SENSR O2 OXIMAX FNGR A/ 18IN NONSTR

## (undated) DEVICE — LN SMPL CO2 SHTRM SD STREAM W/M LUER

## (undated) DEVICE — CANN NASL CO2 TRULINK W/O2 A/

## (undated) DEVICE — BITEBLOCK OMNI BLOC

## (undated) DEVICE — KT VLV BIOGUARD SXN BIOP AIR/H20 CONN 4PC DISP

## (undated) DEVICE — TUBING, SUCTION, 1/4" X 10', STRAIGHT: Brand: MEDLINE

## (undated) DEVICE — THE TORRENT IRRIGATION SCOPE CONNECTOR IS USED WITH THE TORRENT IRRIGATION TUBING TO PROVIDE IRRIGATION FLUIDS SUCH AS STERILE WATER DURING GASTROINTESTINAL ENDOSCOPIC PROCEDURES WHEN USED IN CONJUNCTION WITH AN IRRIGATION PUMP (OR ELECTROSURGICAL UNIT).: Brand: TORRENT

## (undated) DEVICE — THE SINGLE USE ETRAP – POLYP TRAP IS USED FOR SUCTION RETRIEVAL OF ENDOSCOPICALLY REMOVED POLYPS.: Brand: ETRAP

## (undated) DEVICE — ADAPT CLN BIOGUARD AIR/H2O DISP

## (undated) DEVICE — CANN O2 ETCO2 FITS ALL CONN CO2 SMPL A/ 7IN DISP LF

## (undated) DEVICE — FRCP BX RADJAW4 NDL 2.8 240CM LG OG BX40

## (undated) DEVICE — KT ORCA ORCAPOD DISP STRL